# Patient Record
Sex: FEMALE | Race: WHITE | NOT HISPANIC OR LATINO | URBAN - METROPOLITAN AREA
[De-identification: names, ages, dates, MRNs, and addresses within clinical notes are randomized per-mention and may not be internally consistent; named-entity substitution may affect disease eponyms.]

---

## 2019-10-09 ENCOUNTER — TELEPHONE (OUTPATIENT)
Dept: OBSTETRICS AND GYNECOLOGY | Age: 54
End: 2019-10-09

## 2019-10-09 ENCOUNTER — OFFICE VISIT (OUTPATIENT)
Dept: OBSTETRICS AND GYNECOLOGY | Age: 54
End: 2019-10-09

## 2019-10-09 VITALS
WEIGHT: 192 LBS | BODY MASS INDEX: 31.99 KG/M2 | DIASTOLIC BLOOD PRESSURE: 68 MMHG | HEIGHT: 65 IN | SYSTOLIC BLOOD PRESSURE: 128 MMHG

## 2019-10-09 DIAGNOSIS — Z11.51 ENCOUNTER FOR SCREENING FOR HUMAN PAPILLOMAVIRUS (HPV): ICD-10-CM

## 2019-10-09 DIAGNOSIS — N95.1 HOT FLASHES DUE TO MENOPAUSE: ICD-10-CM

## 2019-10-09 DIAGNOSIS — N95.1 HOT FLASHES DUE TO MENOPAUSE: Primary | ICD-10-CM

## 2019-10-09 DIAGNOSIS — Z01.419 WELL WOMAN EXAM WITH ROUTINE GYNECOLOGICAL EXAM: Primary | ICD-10-CM

## 2019-10-09 PROCEDURE — 99386 PREV VISIT NEW AGE 40-64: CPT | Performed by: OBSTETRICS & GYNECOLOGY

## 2019-10-09 RX ORDER — AMITRIPTYLINE HYDROCHLORIDE 50 MG/1
50 TABLET, FILM COATED ORAL NIGHTLY
COMMUNITY
End: 2022-04-12

## 2019-10-09 RX ORDER — ESTRADIOL 0.07 MG/D
1 FILM, EXTENDED RELEASE TRANSDERMAL 2 TIMES WEEKLY
Qty: 8 PATCH | Refills: 11 | Status: SHIPPED | OUTPATIENT
Start: 2019-10-10 | End: 2022-02-17

## 2019-10-09 RX ORDER — LEVOTHYROXINE SODIUM 0.05 MG/1
50 TABLET ORAL DAILY
COMMUNITY
End: 2022-04-12 | Stop reason: SDUPTHER

## 2019-10-09 RX ORDER — ESTRADIOL 1 MG/1
1 TABLET ORAL DAILY
Qty: 30 TABLET | Refills: 11 | Status: SHIPPED | OUTPATIENT
Start: 2019-10-09 | End: 2019-10-09 | Stop reason: CLARIF

## 2019-10-09 RX ORDER — CHOLECALCIFEROL (VITAMIN D3) 125 MCG
10 CAPSULE ORAL NIGHTLY
COMMUNITY

## 2019-10-09 NOTE — TELEPHONE ENCOUNTER
Dr Ghosh pt looked up what her ins will cover: Estradiol bi weekly, Keli, Climara, Kim, or combi-patch. Pharm on file nka

## 2019-10-09 NOTE — PROGRESS NOTES
Chief complaint: annual    Subjective   History of Present Illness    Monica Olguin is a 53 y.o.  who presents for annual exam.  Her menses are absent. LMP 2017. No PMB. C/o hot flashes but getting slightly better. C/o terrible insomnia since menopause. Is interested in hormones. Desires natural progesterone cream. Has tried multiple sleeping pills without improvement.  Soc Hx- worked in [place] in past, now works as a nanny and an  ([place]) , has 2 grown daughter (a doctor and a )  MMG 2019 normal  Colonoscopy  normal  Obstetric History:  OB History      Para Term  AB Living    2 2 2     2    SAB TAB Ectopic Molar Multiple Live Births              2         Menstrual History:     Patient's last menstrual period was 2017 (exact date).         Current contraception: post menopausal status  History of abnormal Pap smear: no  Received Gardasil immunization: no  Perform regular self breast exam: yes -    Family history of uterine or ovarian cancer: no  Family History of colon cancer: no  Family history of breast cancer: no    Mammogram: up to date.  Colonoscopy: up to date.  DEXA: not indicated.    Exercise: moderately active  Calcium/Vitamin D: adequate intake    The following portions of the patient's history were reviewed and updated as appropriate: allergies, current medications, past family history, past medical history, past social history, past surgical history and problem list.    Review of Systems   Constitutional: Negative for activity change, fatigue, fever and unexpected weight change.   Respiratory: Negative for chest tightness and shortness of breath.    Cardiovascular: Negative for chest pain, palpitations and leg swelling.   Gastrointestinal: Negative for abdominal distention, abdominal pain, blood in stool, constipation, diarrhea, nausea and vomiting.   Endocrine: Positive for heat intolerance. Negative for cold intolerance, polydipsia,  "polyphagia and polyuria.   Genitourinary: Negative.    Musculoskeletal: Negative for arthralgias and back pain.   Skin: Negative for color change.   Neurological: Negative for weakness and headaches.   Hematological: Does not bruise/bleed easily.   Psychiatric/Behavioral: Negative for confusion.       Pertinent items are noted in HPI.     Objective   Physical Exam    /68   Ht 165.1 cm (65\")   Wt 87.1 kg (192 lb)   LMP 06/01/2017 (Exact Date)   Breastfeeding? No   BMI 31.95 kg/m²     General:   alert, appears stated age and cooperative   Neck: no asymmetry, masses, or scars   Heart: regular rate and rhythm, S1, S2 normal, no murmur, click, rub or gallop   Lungs: clear to auscultation bilaterally   Abdomen: soft, non-tender, without masses or organomegaly   Breast: inspection negative, no nipple discharge or bleeding, no masses or nodularity palpable   Vulva: Bartholin's, Urethra, Bagley's normal   Vagina: normal mucosa   Cervix: multiparous appearance, no bleeding following Pap and no cervical motion tenderness   Uterus: normal size, mobile, non-tender, normal shape and consistency   Adnexa: normal adnexa and no mass, fullness, tenderness   Rectal: not indicated     Assessment/Plan   Monica was seen today for gynecologic exam.    Diagnoses and all orders for this visit:    Well woman exam with routine gynecological exam  -     PapIG, HPV, Rfx 16 / 18    Encounter for screening for human papillomavirus (HPV)  -     PapIG, HPV, Rfx 16 / 18    Hot flashes due to menopause  -     estradiol (ESTRACE) 1 MG tablet; Take 1 tablet by mouth Daily.  -     progesterone (PROMETRIUM) 100 MG capsule; Take 1 capsule by mouth Daily.    I do not recommend non-FDA approved hormones from compounding pharmacy. Not sure how much \"progesterone\" she would be getting using their progesterone cream along with oral estrogen. Could consider estrogen/progesterone patch, but not covered by Medicaid (need generic meds) pt can call " insurance to see what is covered . Risks of hormones discussed- DVT, PE, stroke, VTE,breast cancer and MI. Recommend lowest dose for shortest amt of time (no more than 5 yrs).    Breast self exam technique reviewed and patient encouraged to perform self-exam monthly.  Discussed healthy lifestyle modifications.  Pap smear sent

## 2019-10-09 NOTE — TELEPHONE ENCOUNTER
Ok, I changed the estrogen to a patch form. That will be estrogen only. She still needs to take the prometrium 100 mg tab daily (the progesterone). That is the best progesterone to help with insomnia.

## 2019-10-11 LAB
CYTOLOGIST CVX/VAG CYTO: NORMAL
CYTOLOGY CVX/VAG DOC CYTO: NORMAL
CYTOLOGY CVX/VAG DOC THIN PREP: NORMAL
DX ICD CODE: NORMAL
HIV 1 & 2 AB SER-IMP: NORMAL
HPV I/H RISK 1 DNA CVX QL PROBE+SIG AMP: NEGATIVE
OTHER STN SPEC: NORMAL
STAT OF ADQ CVX/VAG CYTO-IMP: NORMAL

## 2019-10-16 ENCOUNTER — TELEPHONE (OUTPATIENT)
Dept: OBSTETRICS AND GYNECOLOGY | Age: 54
End: 2019-10-16

## 2022-02-17 ENCOUNTER — OFFICE VISIT (OUTPATIENT)
Dept: OBSTETRICS AND GYNECOLOGY | Facility: CLINIC | Age: 57
End: 2022-02-17

## 2022-02-17 VITALS
DIASTOLIC BLOOD PRESSURE: 62 MMHG | BODY MASS INDEX: 29.82 KG/M2 | WEIGHT: 179 LBS | SYSTOLIC BLOOD PRESSURE: 108 MMHG | HEIGHT: 65 IN

## 2022-02-17 DIAGNOSIS — Z01.419 PAP SMEAR, LOW-RISK: ICD-10-CM

## 2022-02-17 DIAGNOSIS — G47.00 INSOMNIA, UNSPECIFIED TYPE: ICD-10-CM

## 2022-02-17 DIAGNOSIS — Z01.419 ROUTINE GYNECOLOGICAL EXAMINATION: Primary | ICD-10-CM

## 2022-02-17 DIAGNOSIS — Z13.89 SCREENING FOR GENITOURINARY CONDITION: ICD-10-CM

## 2022-02-17 DIAGNOSIS — N95.2 VAGINAL ATROPHY: ICD-10-CM

## 2022-02-17 DIAGNOSIS — Z11.51 SPECIAL SCREENING EXAMINATION FOR HUMAN PAPILLOMAVIRUS (HPV): ICD-10-CM

## 2022-02-17 LAB
BILIRUB BLD-MCNC: NEGATIVE MG/DL
CLARITY, POC: CLEAR
COLOR UR: YELLOW
GLUCOSE UR STRIP-MCNC: NEGATIVE MG/DL
KETONES UR QL: NEGATIVE
LEUKOCYTE EST, POC: NEGATIVE
NITRITE UR-MCNC: NEGATIVE MG/ML
PH UR: 5 [PH] (ref 5–8)
PROT UR STRIP-MCNC: NEGATIVE MG/DL
RBC # UR STRIP: NEGATIVE /UL
SP GR UR: 1.03 (ref 1–1.03)
UROBILINOGEN UR QL: NORMAL

## 2022-02-17 PROCEDURE — 2014F MENTAL STATUS ASSESS: CPT | Performed by: OBSTETRICS & GYNECOLOGY

## 2022-02-17 PROCEDURE — 99396 PREV VISIT EST AGE 40-64: CPT | Performed by: OBSTETRICS & GYNECOLOGY

## 2022-02-17 PROCEDURE — 3008F BODY MASS INDEX DOCD: CPT | Performed by: OBSTETRICS & GYNECOLOGY

## 2022-02-17 RX ORDER — HYDROXYZINE HYDROCHLORIDE 25 MG/1
50 TABLET, FILM COATED ORAL DAILY
COMMUNITY
Start: 2022-01-26

## 2022-02-17 RX ORDER — ESTRADIOL 0.1 MG/G
CREAM VAGINAL
Qty: 1 G | Refills: 12 | Status: SHIPPED | OUTPATIENT
Start: 2022-02-17 | End: 2023-02-21 | Stop reason: SDUPTHER

## 2022-02-17 RX ORDER — PROGESTERONE 100 MG/1
100 CAPSULE ORAL DAILY
Qty: 30 CAPSULE | Refills: 2 | Status: SHIPPED | OUTPATIENT
Start: 2022-02-17 | End: 2022-04-12

## 2022-02-17 RX ORDER — ACETAMINOPHEN 160 MG
2000 TABLET,DISINTEGRATING ORAL DAILY
COMMUNITY
Start: 2022-01-15

## 2022-02-17 NOTE — PROGRESS NOTES
GYN Annual Exam     CC- Here for annual exam.     Monica Olguin is a 56 y.o. female who presents for annual well woman exam. Pt is a new pt to me. Pt had her LMP 50-52yo. Pt started HRT last year for insomnia: she was placed on estradiol and Prometrium. She has now stopped the HRT bc she doesn't want to be on her meds. She thinks the HRT may have helped some. She is now using some CBD oil and this is helping. Rare vasomotor sx. Pt is cramping intermittently every day. No VB. Pt has had a colonoscopy 1-2 years ago. Pt is not sexually active. She is reporting vaginal irritation and sx like a UTI.    OB History        2    Para   2    Term   2            AB        Living   2       SAB        IAB        Ectopic        Molar        Multiple        Live Births   2                Current contraception: post menopausal status  History of abnormal Pap smear: no  History of abnormal mammogram: no  Family history of uterine, colon or ovarian cancer: no  Family history of breast cancer: no    Health Maintenance   Topic Date Due   • MAMMOGRAM  Never done   • URINE MICROALBUMIN  Never done   • COLORECTAL CANCER SCREENING  Never done   • ANNUAL PHYSICAL  Never done   • Pneumococcal Vaccine 0-64 (1 of 2 - PPSV23) Never done   • ZOSTER VACCINE (1 of 2) Never done   • HEPATITIS C SCREENING  Never done   • DIABETIC FOOT EXAM  Never done   • HEMOGLOBIN A1C  Never done   • DIABETIC EYE EXAM  Never done   • Annual Gynecologic Pelvic and Breast Exam  10/10/2020   • PAP SMEAR  10/09/2022   • TDAP/TD VACCINES (2 - Td or Tdap) 2031   • COVID-19 Vaccine  Completed   • INFLUENZA VACCINE  Completed       Past Medical History:   Diagnosis Date   • Diabetes mellitus (HCC)    • Disease of thyroid gland        History reviewed. No pertinent surgical history.      Current Outpatient Medications:   •  amitriptyline (ELAVIL) 50 MG tablet, Take 50 mg by mouth Every Night., Disp: , Rfl:   •  Cholecalciferol (Vitamin D3) 50 MCG  "(2000 UT) capsule, , Disp: , Rfl:   •  estradiol (ESTRACE VAGINAL) 0.1 MG/GM vaginal cream, Insert 1gm vaginally x 2 weeks and then 2x per week, Disp: 1 g, Rfl: 12  •  hydrOXYzine (ATARAX) 25 MG tablet, , Disp: , Rfl:   •  levothyroxine (SYNTHROID, LEVOTHROID) 50 MCG tablet, Take 50 mcg by mouth Daily., Disp: , Rfl:   •  melatonin 5 MG tablet tablet, Take 10 mg by mouth., Disp: , Rfl:   •  progesterone (PROMETRIUM) 100 MG capsule, Take 1 capsule by mouth Daily., Disp: 30 capsule, Rfl: 11  •  Progesterone (Prometrium) 100 MG capsule, Take 1 capsule by mouth Daily., Disp: 30 capsule, Rfl: 2    Allergies   Allergen Reactions   • Eggs Or Egg-Derived Products Shortness Of Breath and Swelling   • Latex Other (See Comments)   • Nuts Rash       Social History     Tobacco Use   • Smoking status: Never Smoker   • Smokeless tobacco: Never Used   Substance Use Topics   • Alcohol use: Yes     Comment: Social   • Drug use: No       Family History   Problem Relation Age of Onset   • Prostate cancer Father    • Diabetes Father    • Diabetes Mother    • Breast cancer Neg Hx    • Ovarian cancer Neg Hx    • Uterine cancer Neg Hx    • Colon cancer Neg Hx        Review of Systems   Constitutional: Negative for appetite change, chills, fatigue, fever and unexpected weight change.   Gastrointestinal: Negative for abdominal distention, abdominal pain, anal bleeding, blood in stool, constipation, diarrhea, nausea and vomiting.   Genitourinary: Negative for dyspareunia, dysuria, menstrual problem, pelvic pain, vaginal bleeding, vaginal discharge and vaginal pain.       /62   Ht 165.1 cm (65\")   Wt 81.2 kg (179 lb)   LMP 06/01/2017 (Exact Date)   BMI 29.79 kg/m²     Physical Exam  Vitals reviewed.   Constitutional:       General: She is not in acute distress.     Appearance: Normal appearance. She is well-developed. She is not ill-appearing, toxic-appearing or diaphoretic.   HENT:      Mouth/Throat:      Dentition: Normal dentition. " No dental caries.   Cardiovascular:      Rate and Rhythm: Normal rate and regular rhythm.      Heart sounds: Normal heart sounds.   Pulmonary:      Effort: Pulmonary effort is normal. No respiratory distress.      Breath sounds: Normal breath sounds. No stridor. No wheezing.   Chest:   Breasts:      Right: No inverted nipple, mass, nipple discharge, skin change or tenderness.      Left: No inverted nipple, mass, nipple discharge, skin change or tenderness.       Abdominal:      General: There is no distension.      Palpations: Abdomen is soft. There is no mass.      Tenderness: There is no abdominal tenderness.   Genitourinary:     General: Normal vulva.      Labia:         Right: No rash, tenderness or lesion.         Left: No rash, tenderness or lesion.       Vagina: No vaginal discharge, tenderness or bleeding.      Cervix: No cervical motion tenderness, discharge or friability.      Uterus: Not deviated, not enlarged, not fixed and not tender.       Adnexa:         Right: No mass, tenderness or fullness.          Left: No mass, tenderness or fullness.     Musculoskeletal:         General: No tenderness. Normal range of motion.   Skin:     General: Skin is warm.      Findings: No erythema or rash.   Neurological:      General: No focal deficit present.      Mental Status: She is alert and oriented to person, place, and time. Mental status is at baseline.      Cranial Nerves: No cranial nerve deficit.      Motor: No weakness.      Coordination: Coordination normal.      Gait: Gait normal.   Psychiatric:         Mood and Affect: Mood normal.         Behavior: Behavior normal.         Thought Content: Thought content normal.         Judgment: Judgment normal.            Assessment/Plan    1) GYN HM: check pap smear. MMG ordered. SBE demonstrated and encouraged. Colonoscopy UTD.  2) : menopause age 50-51 years. Pt struggling with insomnia. Discussed restating Prometrium 100mg po qhs ONLY. Pt denies any vasomotor  sx so she does not need estrogen. If pt does not notice any improvement after 1-2 months on meds then wouldn't continue. Pt agrees with plan.  3) Bone health - Weight bearing exercise, dietary calcium recommendations and vitamin D reviewed.   4) Diet and Exercise discussed  5) Smoking Status: nonsmoker  6) Social: pt's daughter is  Nelda Ramy  7) vaginal irritation: Suspect atrophy. Discussed Replens vs Estarce cream. Pt desires to start Estarce cream- rx written.  8) Follow up prn and 1 year       Diagnoses and all orders for this visit:    Routine gynecological examination  -     IgP, Aptima HPV  -     Mammo Screening Bilateral With CAD; Future    Screening for genitourinary condition  -     POC Urinalysis Dipstick    Pap smear, low-risk  -     IgP, Aptima HPV    Special screening examination for human papillomavirus (HPV)  -     IgP, Aptima HPV    Insomnia, unspecified type    Vaginal atrophy    Other orders  -     Cholecalciferol (Vitamin D3) 50 MCG (2000 UT) capsule  -     hydrOXYzine (ATARAX) 25 MG tablet  -     Progesterone (Prometrium) 100 MG capsule; Take 1 capsule by mouth Daily.  -     estradiol (ESTRACE VAGINAL) 0.1 MG/GM vaginal cream; Insert 1gm vaginally x 2 weeks and then 2x per week        Lavern Miner, DO  2/17/2022  14:48 EST

## 2022-02-21 ENCOUNTER — TRANSCRIBE ORDERS (OUTPATIENT)
Dept: ADMINISTRATIVE | Facility: HOSPITAL | Age: 57
End: 2022-02-21

## 2022-02-21 DIAGNOSIS — Z12.31 SCREENING MAMMOGRAM, ENCOUNTER FOR: Primary | ICD-10-CM

## 2022-02-22 LAB
CYTOLOGIST CVX/VAG CYTO: NORMAL
CYTOLOGY CVX/VAG DOC CYTO: NORMAL
CYTOLOGY CVX/VAG DOC THIN PREP: NORMAL
DX ICD CODE: NORMAL
HIV 1 & 2 AB SER-IMP: NORMAL
HPV I/H RISK 4 DNA CVX QL PROBE+SIG AMP: NEGATIVE
OTHER STN SPEC: NORMAL
STAT OF ADQ CVX/VAG CYTO-IMP: NORMAL

## 2022-03-12 ENCOUNTER — HOSPITAL ENCOUNTER (OUTPATIENT)
Dept: MAMMOGRAPHY | Facility: HOSPITAL | Age: 57
Discharge: HOME | End: 2022-03-12
Admitting: OBSTETRICS & GYNECOLOGY

## 2022-03-12 ENCOUNTER — APPOINTMENT (OUTPATIENT)
Dept: MAMMOGRAPHY | Facility: HOSPITAL | Age: 57
End: 2022-03-12

## 2022-03-12 DIAGNOSIS — Z12.31 SCREENING MAMMOGRAM, ENCOUNTER FOR: ICD-10-CM

## 2022-03-12 PROCEDURE — 77067 SCR MAMMO BI INCL CAD: CPT

## 2022-03-12 PROCEDURE — 77063 BREAST TOMOSYNTHESIS BI: CPT

## 2022-03-18 ENCOUNTER — TELEPHONE (OUTPATIENT)
Dept: OBSTETRICS AND GYNECOLOGY | Facility: CLINIC | Age: 57
End: 2022-03-18

## 2022-03-18 NOTE — TELEPHONE ENCOUNTER
Patient called and stated she used the Rx for 2 weeks, but missed a dose and then doubled up for 2 weeks. She feels better, but had bleeding for 2 days. Wants to know if she could have another round of the medication. Please advise

## 2022-03-19 NOTE — TELEPHONE ENCOUNTER
I dont know what medicaiton you are referring? I gave her Estrace cream that she was supposed to use nightly for 2 weeks and then continue to use 2-3 times per week. If that what you are talking about?

## 2022-03-21 NOTE — TELEPHONE ENCOUNTER
PATIENT STATES IT FOR THE ESTRACE CREAM AND WOULD LIKE TO KNOW IF SHE NEEDS AN U/S BECAUSE SHE WAS BLEEDING FOR 2 DAYS?

## 2022-04-12 ENCOUNTER — PREP FOR SURGERY (OUTPATIENT)
Dept: OTHER | Facility: HOSPITAL | Age: 57
End: 2022-04-12

## 2022-04-12 ENCOUNTER — OFFICE VISIT (OUTPATIENT)
Dept: OBSTETRICS AND GYNECOLOGY | Facility: CLINIC | Age: 57
End: 2022-04-12

## 2022-04-12 VITALS
BODY MASS INDEX: 29.99 KG/M2 | DIASTOLIC BLOOD PRESSURE: 72 MMHG | SYSTOLIC BLOOD PRESSURE: 112 MMHG | WEIGHT: 180 LBS | HEIGHT: 65 IN

## 2022-04-12 DIAGNOSIS — N95.0 POSTMENOPAUSAL BLEEDING: Primary | ICD-10-CM

## 2022-04-12 DIAGNOSIS — N95.0 PMB (POSTMENOPAUSAL BLEEDING): Primary | ICD-10-CM

## 2022-04-12 DIAGNOSIS — Z13.89 SCREENING FOR GENITOURINARY CONDITION: ICD-10-CM

## 2022-04-12 LAB
BILIRUB BLD-MCNC: NEGATIVE MG/DL
CLARITY, POC: CLEAR
COLOR UR: YELLOW
GLUCOSE UR STRIP-MCNC: NEGATIVE MG/DL
KETONES UR QL: NEGATIVE
LEUKOCYTE EST, POC: NEGATIVE
NITRITE UR-MCNC: NEGATIVE MG/ML
PH UR: 5 [PH] (ref 5–8)
PROT UR STRIP-MCNC: NEGATIVE MG/DL
RBC # UR STRIP: NEGATIVE /UL
SP GR UR: 1.02 (ref 1–1.03)
UROBILINOGEN UR QL: NORMAL

## 2022-04-12 PROCEDURE — 99214 OFFICE O/P EST MOD 30 MIN: CPT | Performed by: OBSTETRICS & GYNECOLOGY

## 2022-04-12 RX ORDER — LEVOTHYROXINE SODIUM 0.05 MG/1
1 TABLET ORAL
COMMUNITY
Start: 2022-02-18

## 2022-04-12 RX ORDER — SODIUM CHLORIDE 9 MG/ML
40 INJECTION, SOLUTION INTRAVENOUS AS NEEDED
Status: CANCELLED | OUTPATIENT
Start: 2022-04-12

## 2022-04-12 RX ORDER — SODIUM CHLORIDE 0.9 % (FLUSH) 0.9 %
3 SYRINGE (ML) INJECTION EVERY 12 HOURS SCHEDULED
Status: CANCELLED | OUTPATIENT
Start: 2022-04-12

## 2022-04-12 RX ORDER — SODIUM CHLORIDE 0.9 % (FLUSH) 0.9 %
1-10 SYRINGE (ML) INJECTION AS NEEDED
Status: CANCELLED | OUTPATIENT
Start: 2022-04-12

## 2022-04-12 NOTE — PROGRESS NOTES
"PROBLEM VISIT    Chief Complaint: PMPB      Monica Olguin is a 56 y.o. patient who presents for follow up of PMPB. Pt started Estrace cream and Prometrium. Pt reports she had 2 days of bright red bleeding. She had another day of spotting. She has not had a cycle in 5 years; She used 2gm of Estrace cream vaginally for 2 weeks and then started with the bleeding. She reports the estrogen cream helped with the burning she as feeling. She has started Prometrium 100mg po qhs and it has helped with sleeping. TVUS performed today reveals EM lining 7mm. She had last bit of bleeding approx 2 weeks ago. She is now using the Estrace cream 2x per week.    Chief Complaint   Patient presents with   • Follow-up     U/S             The following portions of the patient's history were reviewed and updated as appropriate: allergies, current medications and problem list.    Review of Systems   Constitutional: Negative for appetite change, chills, fatigue, fever and unexpected weight change.   Gastrointestinal: Negative for abdominal distention, abdominal pain, anal bleeding, blood in stool, constipation, diarrhea, nausea and vomiting.   Genitourinary: Positive for vaginal bleeding. Negative for dyspareunia, dysuria, menstrual problem, pelvic pain, vaginal discharge and vaginal pain.       /72   Ht 165.1 cm (65\")   Wt 81.6 kg (180 lb)   LMP 06/01/2017 (Exact Date)   BMI 29.95 kg/m²     Physical Exam  Vitals reviewed.   Constitutional:       General: She is not in acute distress.     Appearance: Normal appearance. She is well-developed. She is not ill-appearing, toxic-appearing or diaphoretic.   Genitourinary:     Labia:         Right: No rash, tenderness, lesion or injury.         Left: No rash, tenderness, lesion or injury.       Vagina: No signs of injury and foreign body. No vaginal discharge, erythema, tenderness or bleeding.      Cervix: No cervical motion tenderness, discharge or friability.   Skin:     General: Skin " is warm.      Coloration: Skin is not pale.      Findings: No erythema or rash.   Neurological:      General: No focal deficit present.      Mental Status: She is alert and oriented to person, place, and time. Mental status is at baseline.      Cranial Nerves: No cranial nerve deficit.      Coordination: Coordination normal.   Psychiatric:         Mood and Affect: Mood normal.         Behavior: Behavior normal.         Thought Content: Thought content normal.         Judgment: Judgment normal.           Assessment/Plan   Diagnoses and all orders for this visit:    1. Postmenopausal bleeding (Primary)    2. Screening for genitourinary condition  -     POC Urinalysis Dipstick    55yo with PMPB    1) PMPB: TVUS ordered today and reveals: AV uterus with smooth borders. EM lining 7mm. Normal ovaries bilaterally. No free fluid. Results reviewed with pt. EM bx attempted but unsuccessful due to stenosis of cervix. Will proceed with hysteroscopy D and C. Procedure reviewed with pt. Risks, benefits and alternatives of the procedure were discussed, including , but not limited to: infection, bleeding, transfusion, injury to adjacent structures including bowel, bladder and ureters, reoperation, recurrent symptoms, thromboembolic events, aneasthesic complications and death. Pre/intra/postop course was reviewed and all questions answered. Patient was encouraged to call for any additional questions she might have in the future. Will schedule.    2) vaginal irritation: Improved since starting Estrace cream    3) difficulty sleeping: Improved with Prometrium    4) Gyn HM: LPS 2/17/22                 No follow-ups on file.      Lavern Miner DO    4/12/2022  15:49 EDT

## 2022-04-13 PROBLEM — N95.0 PMB (POSTMENOPAUSAL BLEEDING): Status: ACTIVE | Noted: 2022-04-13

## 2022-05-04 ENCOUNTER — APPOINTMENT (OUTPATIENT)
Dept: PREADMISSION TESTING | Facility: HOSPITAL | Age: 57
End: 2022-05-04

## 2022-05-09 ENCOUNTER — PRE-ADMISSION TESTING (OUTPATIENT)
Dept: PREADMISSION TESTING | Facility: HOSPITAL | Age: 57
End: 2022-05-09

## 2022-05-09 ENCOUNTER — APPOINTMENT (OUTPATIENT)
Dept: LAB | Facility: HOSPITAL | Age: 57
End: 2022-05-09

## 2022-05-09 VITALS
BODY MASS INDEX: 30.16 KG/M2 | HEIGHT: 65 IN | HEART RATE: 67 BPM | OXYGEN SATURATION: 99 % | DIASTOLIC BLOOD PRESSURE: 77 MMHG | SYSTOLIC BLOOD PRESSURE: 157 MMHG | WEIGHT: 181 LBS | RESPIRATION RATE: 18 BRPM

## 2022-05-09 LAB
DEPRECATED RDW RBC AUTO: 40.6 FL (ref 37–54)
ERYTHROCYTE [DISTWIDTH] IN BLOOD BY AUTOMATED COUNT: 11.8 % (ref 12.3–15.4)
HCT VFR BLD AUTO: 40.4 % (ref 34–46.6)
HGB BLD-MCNC: 13 G/DL (ref 12–15.9)
MCH RBC QN AUTO: 29.5 PG (ref 26.6–33)
MCHC RBC AUTO-ENTMCNC: 32.2 G/DL (ref 31.5–35.7)
MCV RBC AUTO: 91.8 FL (ref 79–97)
PLATELET # BLD AUTO: 185 10*3/MM3 (ref 140–450)
PMV BLD AUTO: 10.2 FL (ref 6–12)
RBC # BLD AUTO: 4.4 10*6/MM3 (ref 3.77–5.28)
SARS-COV-2 RNA PNL SPEC NAA+PROBE: NOT DETECTED
WBC NRBC COR # BLD: 5.17 10*3/MM3 (ref 3.4–10.8)

## 2022-05-09 PROCEDURE — C9803 HOPD COVID-19 SPEC COLLECT: HCPCS

## 2022-05-09 PROCEDURE — 87635 SARS-COV-2 COVID-19 AMP PRB: CPT | Performed by: OBSTETRICS & GYNECOLOGY

## 2022-05-09 PROCEDURE — 36415 COLL VENOUS BLD VENIPUNCTURE: CPT

## 2022-05-09 PROCEDURE — 85027 COMPLETE CBC AUTOMATED: CPT | Performed by: OBSTETRICS & GYNECOLOGY

## 2022-05-09 NOTE — PAT
Pt here for PAT visit.  Pre-op tests completed, chg soap given, and instructions reviewed.  Instructed clears until 2 hrs prior to arrival time, voiced understanding. Pt had several questions concerning consents. Questions answered and patient instructed to call office for more information concerning procedure.

## 2022-05-09 NOTE — DISCHARGE INSTRUCTIONS
PRE-ADMISSION TESTING INSTRUCTIONS FOR ADULTS    Take these medications the morning of surgery with a small sip of water: Levothyroxine, Hydroxyzine      No aspirin, advil, aleve, ibuprofen, naproxen, diet pills, decongestants, or herbal/vitamins for a week prior to surgery.    General Instructions:    DO NOT EAT SOLID FOOD AFTER MIDNIGHT THE NIGHT BEFORE SURGERY. No gum, mints, or hard candy after midnight the night before surgery.  You may drink clear liquids the day of surgery up until 2 hours before your arrival time.  Clear liquids are liquids you can see through. Nothing RED in color.    Plain water    Sports drinks  Sodas     Gelatin (Jell-O)  Fruit juices without pulp such as white grape juice and apple juice  Popsicles that contain no fruit or yogurt  Tea or coffee (no cream or milk added)    It is beneficial for you to have a clear drink that contains carbohydrates just before you leave your house and before your fasting time begins.  We suggest a 20 ounce bottle of Gatorade or Powerade for non-diabetic patients or a 20 ounce bottle of G2 or Powerade Zero for diabetic patients.     Patients who avoid smoking, chewing tobacco and alcohol for 4 weeks prior to surgery have a reduced risk of post-operative complications.  If at all possible, quit smoking as many days before surgery as you can.    Do not smoke, use chewing tobacco or drink alcohol the day of surgery    Bring your C-PAP/ BI-PAP machine if you use one.  Wear clean comfortable clothes and socks.  Do not wear contact lenses, lotion, deodorant, or make-up.  Bring a case for your glasses if applicable. You may brush your teeth the morning of surgery.  You may wear dentures/partials, do not put adhesive/glue on them.    Leave all other jewelry and valuables at home.      Preventing a Surgical Site Infection:    Shower the night before and on the morning of surgery using the chlorhexidine soap you were given.  Use a clean washcloth with the soap.   Place clean sheets on your bed after showering the night before surgery. Do not use the CHG soap on your hair, face, or private areas. Wash your body gently for five (5) minutes. Do not scrub your skin.  Dry with a clean towel and dress in clean clothing.    Do not shave the surgical area for 10 days-2 weeks prior to surgery  because the razor can irritate skin and make it easier to develop an infection.  Make sure you, your family, and all healthcare providers clean their hands with soap and water or an alcohol based hand  before caring for you or your wound.      Day of surgery:    Your surgeon’s office will advise you of your arrival time for the day of surgery.    Upon arrival, a Pre-op nurse and Anesthesia provider will review your health history, obtain vital signs, and answer questions you may have.  The only belongings needed at this time will be your home medications and if applicable your C-PAP/BI-PAP machine.  If you are staying overnight your family can leave the rest of your belongings in the car and bring them to your room later.  A Pre-op nurse will start an IV and you may receive medication in preparation for surgery, including something to help you relax.  Your family will be able to see you in the Pre-op area.  While you are in surgery your family should notify the waiting room  if they leave the waiting room area and provide a contact phone number.    IF you have any questions, you can call the Pre-Admission Department at [phone] or your surgeon's office.  Notify your surgeon if  you become sick, have a fever, productive cough, or cannot be here the day of surgery    Please be aware that surgery does come with discomfort.  We want to make every effort to control your discomfort so please discuss any uncontrolled symptoms with your nurse.   Your doctor will most likely have prescribed pain medications.      If you are going home after surgery, you will receive  individualized written care instructions before being discharged.  A responsible adult (over the age of 18) must drive you to and from the hospital on the day of your surgery and stay with you for 24 hours after anesthesia.    If you are staying overnight following surgery, you will be transported to your hospital room following the recovery period.  [facility] has all private rooms.    You may receive a survey regarding the care you received. Your feedback is very important and will be used to collect the necessary data to help us to continue to provide excellent care.     Deductibles and co-payments are collected on the day of service. Please be prepared to pay the required co-pay, deductible or deposit on the day of service as defined by your plan.

## 2022-05-10 ENCOUNTER — ANESTHESIA EVENT (OUTPATIENT)
Dept: PERIOP | Facility: HOSPITAL | Age: 57
End: 2022-05-10

## 2022-05-11 ENCOUNTER — HOSPITAL ENCOUNTER (OUTPATIENT)
Facility: HOSPITAL | Age: 57
Setting detail: HOSPITAL OUTPATIENT SURGERY
Discharge: HOME | End: 2022-05-11
Attending: OBSTETRICS & GYNECOLOGY | Admitting: OBSTETRICS & GYNECOLOGY

## 2022-05-11 ENCOUNTER — ANESTHESIA (OUTPATIENT)
Dept: PERIOP | Facility: HOSPITAL | Age: 57
End: 2022-05-11

## 2022-05-11 VITALS
SYSTOLIC BLOOD PRESSURE: 117 MMHG | HEART RATE: 59 BPM | BODY MASS INDEX: 30.12 KG/M2 | DIASTOLIC BLOOD PRESSURE: 62 MMHG | RESPIRATION RATE: 14 BRPM | OXYGEN SATURATION: 97 % | TEMPERATURE: 98.7 F | WEIGHT: 181 LBS

## 2022-05-11 DIAGNOSIS — N95.0 PMB (POSTMENOPAUSAL BLEEDING): ICD-10-CM

## 2022-05-11 PROCEDURE — 88305 TISSUE EXAM BY PATHOLOGIST: CPT | Performed by: OBSTETRICS & GYNECOLOGY

## 2022-05-11 PROCEDURE — 25010000002 PROPOFOL 10 MG/ML EMULSION: Performed by: NURSE ANESTHETIST, CERTIFIED REGISTERED

## 2022-05-11 PROCEDURE — 58558 HYSTEROSCOPY BIOPSY: CPT | Performed by: OBSTETRICS & GYNECOLOGY

## 2022-05-11 PROCEDURE — 25010000002 DEXAMETHASONE PER 1 MG: Performed by: NURSE ANESTHETIST, CERTIFIED REGISTERED

## 2022-05-11 PROCEDURE — 25010000002 ONDANSETRON PER 1 MG: Performed by: NURSE ANESTHETIST, CERTIFIED REGISTERED

## 2022-05-11 PROCEDURE — 25010000002 KETOROLAC TROMETHAMINE PER 15 MG: Performed by: NURSE ANESTHETIST, CERTIFIED REGISTERED

## 2022-05-11 PROCEDURE — 25010000002 FENTANYL CITRATE (PF) 50 MCG/ML SOLUTION: Performed by: NURSE ANESTHETIST, CERTIFIED REGISTERED

## 2022-05-11 RX ORDER — OXYCODONE HYDROCHLORIDE AND ACETAMINOPHEN 5; 325 MG/1; MG/1
1 TABLET ORAL ONCE AS NEEDED
Status: DISCONTINUED | OUTPATIENT
Start: 2022-05-11 | End: 2022-05-11 | Stop reason: HOSPADM

## 2022-05-11 RX ORDER — SODIUM CHLORIDE 0.9 % (FLUSH) 0.9 %
10 SYRINGE (ML) INJECTION EVERY 12 HOURS SCHEDULED
Status: DISCONTINUED | OUTPATIENT
Start: 2022-05-11 | End: 2022-05-11 | Stop reason: HOSPADM

## 2022-05-11 RX ORDER — OXYCODONE AND ACETAMINOPHEN 7.5; 325 MG/1; MG/1
1 TABLET ORAL ONCE AS NEEDED
Status: DISCONTINUED | OUTPATIENT
Start: 2022-05-11 | End: 2022-05-11 | Stop reason: HOSPADM

## 2022-05-11 RX ORDER — ONDANSETRON 2 MG/ML
4 INJECTION INTRAMUSCULAR; INTRAVENOUS ONCE AS NEEDED
Status: DISCONTINUED | OUTPATIENT
Start: 2022-05-11 | End: 2022-05-11 | Stop reason: HOSPADM

## 2022-05-11 RX ORDER — LIDOCAINE HYDROCHLORIDE 10 MG/ML
0.5 INJECTION, SOLUTION EPIDURAL; INFILTRATION; INTRACAUDAL; PERINEURAL ONCE AS NEEDED
Status: DISCONTINUED | OUTPATIENT
Start: 2022-05-11 | End: 2022-05-11 | Stop reason: HOSPADM

## 2022-05-11 RX ORDER — KETAMINE HYDROCHLORIDE 10 MG/ML
INJECTION INTRAMUSCULAR; INTRAVENOUS AS NEEDED
Status: DISCONTINUED | OUTPATIENT
Start: 2022-05-11 | End: 2022-05-11 | Stop reason: SURG

## 2022-05-11 RX ORDER — SODIUM CHLORIDE, SODIUM LACTATE, POTASSIUM CHLORIDE, CALCIUM CHLORIDE 600; 310; 30; 20 MG/100ML; MG/100ML; MG/100ML; MG/100ML
100 INJECTION, SOLUTION INTRAVENOUS CONTINUOUS
Status: DISCONTINUED | OUTPATIENT
Start: 2022-05-11 | End: 2022-05-11 | Stop reason: HOSPADM

## 2022-05-11 RX ORDER — FENTANYL CITRATE 50 UG/ML
INJECTION, SOLUTION INTRAMUSCULAR; INTRAVENOUS AS NEEDED
Status: DISCONTINUED | OUTPATIENT
Start: 2022-05-11 | End: 2022-05-11 | Stop reason: SURG

## 2022-05-11 RX ORDER — MIDAZOLAM HYDROCHLORIDE 2 MG/2ML
0.5 INJECTION, SOLUTION INTRAMUSCULAR; INTRAVENOUS
Status: DISCONTINUED | OUTPATIENT
Start: 2022-05-11 | End: 2022-05-11 | Stop reason: HOSPADM

## 2022-05-11 RX ORDER — MAGNESIUM HYDROXIDE 1200 MG/15ML
LIQUID ORAL AS NEEDED
Status: DISCONTINUED | OUTPATIENT
Start: 2022-05-11 | End: 2022-05-11 | Stop reason: HOSPADM

## 2022-05-11 RX ORDER — LIDOCAINE HYDROCHLORIDE 20 MG/ML
INJECTION, SOLUTION INFILTRATION; PERINEURAL AS NEEDED
Status: DISCONTINUED | OUTPATIENT
Start: 2022-05-11 | End: 2022-05-11 | Stop reason: SURG

## 2022-05-11 RX ORDER — SODIUM CHLORIDE 0.9 % (FLUSH) 0.9 %
3 SYRINGE (ML) INJECTION EVERY 12 HOURS SCHEDULED
Status: DISCONTINUED | OUTPATIENT
Start: 2022-05-11 | End: 2022-05-11 | Stop reason: HOSPADM

## 2022-05-11 RX ORDER — FAMOTIDINE 10 MG/ML
20 INJECTION, SOLUTION INTRAVENOUS
Status: COMPLETED | OUTPATIENT
Start: 2022-05-11 | End: 2022-05-11

## 2022-05-11 RX ORDER — PROPOFOL 10 MG/ML
VIAL (ML) INTRAVENOUS AS NEEDED
Status: DISCONTINUED | OUTPATIENT
Start: 2022-05-11 | End: 2022-05-11 | Stop reason: SURG

## 2022-05-11 RX ORDER — SODIUM CHLORIDE, SODIUM LACTATE, POTASSIUM CHLORIDE, CALCIUM CHLORIDE 600; 310; 30; 20 MG/100ML; MG/100ML; MG/100ML; MG/100ML
9 INJECTION, SOLUTION INTRAVENOUS CONTINUOUS PRN
Status: DISCONTINUED | OUTPATIENT
Start: 2022-05-11 | End: 2022-05-11 | Stop reason: HOSPADM

## 2022-05-11 RX ORDER — DEXAMETHASONE SODIUM PHOSPHATE 4 MG/ML
8 INJECTION, SOLUTION INTRA-ARTICULAR; INTRALESIONAL; INTRAMUSCULAR; INTRAVENOUS; SOFT TISSUE ONCE AS NEEDED
Status: COMPLETED | OUTPATIENT
Start: 2022-05-11 | End: 2022-05-11

## 2022-05-11 RX ORDER — SODIUM CHLORIDE 9 MG/ML
40 INJECTION, SOLUTION INTRAVENOUS AS NEEDED
Status: DISCONTINUED | OUTPATIENT
Start: 2022-05-11 | End: 2022-05-11 | Stop reason: HOSPADM

## 2022-05-11 RX ORDER — SODIUM CHLORIDE 0.9 % (FLUSH) 0.9 %
10 SYRINGE (ML) INJECTION AS NEEDED
Status: DISCONTINUED | OUTPATIENT
Start: 2022-05-11 | End: 2022-05-11 | Stop reason: HOSPADM

## 2022-05-11 RX ORDER — IBUPROFEN 800 MG/1
800 TABLET ORAL EVERY 8 HOURS PRN
Qty: 30 TABLET | Refills: 0 | Status: SHIPPED | OUTPATIENT
Start: 2022-05-11 | End: 2022-06-10

## 2022-05-11 RX ORDER — KETOROLAC TROMETHAMINE 30 MG/ML
INJECTION, SOLUTION INTRAMUSCULAR; INTRAVENOUS AS NEEDED
Status: DISCONTINUED | OUTPATIENT
Start: 2022-05-11 | End: 2022-05-11 | Stop reason: SURG

## 2022-05-11 RX ORDER — SODIUM CHLORIDE 0.9 % (FLUSH) 0.9 %
1-10 SYRINGE (ML) INJECTION AS NEEDED
Status: DISCONTINUED | OUTPATIENT
Start: 2022-05-11 | End: 2022-05-11 | Stop reason: HOSPADM

## 2022-05-11 RX ORDER — ONDANSETRON 2 MG/ML
4 INJECTION INTRAMUSCULAR; INTRAVENOUS ONCE AS NEEDED
Status: COMPLETED | OUTPATIENT
Start: 2022-05-11 | End: 2022-05-11

## 2022-05-11 RX ORDER — ACETAMINOPHEN 500 MG
1000 TABLET ORAL ONCE
Status: COMPLETED | OUTPATIENT
Start: 2022-05-11 | End: 2022-05-11

## 2022-05-11 RX ORDER — DEXMEDETOMIDINE HYDROCHLORIDE 100 UG/ML
INJECTION, SOLUTION INTRAVENOUS AS NEEDED
Status: DISCONTINUED | OUTPATIENT
Start: 2022-05-11 | End: 2022-05-11 | Stop reason: SURG

## 2022-05-11 RX ADMIN — SODIUM CHLORIDE, POTASSIUM CHLORIDE, SODIUM LACTATE AND CALCIUM CHLORIDE 9 ML/HR: 600; 310; 30; 20 INJECTION, SOLUTION INTRAVENOUS at 09:46

## 2022-05-11 RX ADMIN — ACETAMINOPHEN 1000 MG: 500 TABLET ORAL at 10:04

## 2022-05-11 RX ADMIN — FENTANYL CITRATE 50 MCG: 50 INJECTION INTRAMUSCULAR; INTRAVENOUS at 11:10

## 2022-05-11 RX ADMIN — FAMOTIDINE 20 MG: 10 INJECTION INTRAVENOUS at 09:46

## 2022-05-11 RX ADMIN — PROPOFOL 100 MG: 10 INJECTION, EMULSION INTRAVENOUS at 11:11

## 2022-05-11 RX ADMIN — KETOROLAC TROMETHAMINE 30 MG: 30 INJECTION, SOLUTION INTRAMUSCULAR; INTRAVENOUS at 11:24

## 2022-05-11 RX ADMIN — DEXMEDETOMIDINE 8 MCG: 100 INJECTION, SOLUTION, CONCENTRATE INTRAVENOUS at 10:56

## 2022-05-11 RX ADMIN — LIDOCAINE HYDROCHLORIDE 60 MG: 20 INJECTION, SOLUTION INFILTRATION; PERINEURAL at 10:56

## 2022-05-11 RX ADMIN — PROPOFOL 75 MCG/KG/MIN: 10 INJECTION, EMULSION INTRAVENOUS at 10:58

## 2022-05-11 RX ADMIN — KETAMINE HYDROCHLORIDE 20 MG: 10 INJECTION, SOLUTION INTRAMUSCULAR; INTRAVENOUS at 10:56

## 2022-05-11 RX ADMIN — ONDANSETRON 4 MG: 2 INJECTION INTRAMUSCULAR; INTRAVENOUS at 09:46

## 2022-05-11 RX ADMIN — PROPOFOL 50 MG: 10 INJECTION, EMULSION INTRAVENOUS at 10:56

## 2022-05-11 RX ADMIN — DEXAMETHASONE SODIUM PHOSPHATE 8 MG: 4 INJECTION, SOLUTION INTRAMUSCULAR; INTRAVENOUS at 09:46

## 2022-05-11 NOTE — ANESTHESIA PREPROCEDURE EVALUATION
Anesthesia Evaluation     Patient summary reviewed and Nursing notes reviewed   no history of anesthetic complications:  NPO Solid Status: > 8 hours  NPO Liquid Status: > 2 hours           Airway   Mallampati: II  TM distance: >3 FB  Neck ROM: full  No difficulty expected  Dental - normal exam     Pulmonary - negative pulmonary ROS and normal exam    breath sounds clear to auscultation  Cardiovascular - negative cardio ROS and normal exam    Rhythm: regular  Rate: normal        Neuro/Psych- negative ROS  GI/Hepatic/Renal/Endo    (+)  GERD well controlled,  thyroid problem hypothyroidism    Musculoskeletal     (+) neck pain,   Abdominal  - normal exam   Substance History      OB/GYN negative ob/gyn ROS         Other   arthritis (knees),      ROS/Med Hx Other: Water with meds 0800                  Anesthesia Plan    ASA 2     MAC   (MAC with progression to GA as needed-discussed with patient and daughter-accepted)  intravenous induction     Anesthetic plan, all risks, benefits, and alternatives have been provided, discussed and informed consent has been obtained with: patient and child.  Use of blood products discussed with patient and child  Consented to blood products.       CODE STATUS:

## 2022-05-11 NOTE — OP NOTE
Operative Note    Date of Service:  05/11/22  Time of Service:  11:43 EDT    Surgical Staff: Surgeon(s) and Role:     * Lavern Miner, DO - Primary   Additional Staff: none   Pre-operative diagnosis(es): Pre-Op Diagnosis Codes:     * PMB (postmenopausal bleeding) [N95.0]     Post-operative diagnosis(es): Post-Op Diagnosis Codes:     * PMB (postmenopausal bleeding) [N95.0]   Procedure(s): Procedure(s):  DILATATION AND CURETTAGE HYSTEROSCOPY     Antibiotics: none ordered on call to OR     Anesthesia: Type: Choice  ASA:  II     Objective      Operative findings:  Cervical polyp appreciated.  Scant endometrial tissue appreciated.  No endometrial polyps.   Specimens removed: ID Type Source Tests Collected by Time   A :  Tissue Endometrial Curettings TISSUE PATHOLOGY EXAM Lavern Miner,  5/11/2022 1117   B :  Polyp Cervix TISSUE PATHOLOGY EXAM Lavern Miner,  5/11/2022 1117      Fluid Intake: 300mL   Output: Documented Output  Est. Blood Loss 1mL  Urine Output 500mL      I/O this shift:  In: 300 [I.V.:300]  Out: 500 [Urine:500]     Blood products used: No   Drains: * No LDAs found *   Implant Information: Nothing was implanted during the procedure   Complications:  None apparent   Intraoperative consult(s):    Condition: stable   Disposition: to PACU and then admit to  Home         Assessment & Plan     56-year-old with no menstrual cycle for the last 5 years presented to the office complaining of postmenopausal bleeding.  Transvaginal ultrasound was performed revealing an endometrial lining of 7 mm.  Normal ovaries bilaterally.  An endometrial biopsy was attempted in the office but unsuccessful due to stenosis of the cervix.  Discussed proceeding with a hysteroscopy with D&C to evaluate the endometrium.  Procedure reviewed with the patient and all questions answered.    After all questions were answered, the patient was taken the operating room and placed under general anesthesia.  She was carefully placed  in the dorsolithotomy position and prepped and draped in normal sterile fashion.  Speculum was placed into the vagina for clear visualization of the cervix.  A single-tooth tenaculum was used to grasp the anterior lip of the cervix.  A cervical polyp was noted.  Polyp forceps were used to grasp the polyp and it was sent to pathology.  Bipolar cauterization was performed to destroy the base of the polyp.  The cervical os was gently dilated.  The uterus sounded to 7 cm.  The hysteroscope was then introduced and the endometrial cavity was distended with normal saline.  A survey of the patient's endometrial cavity revealed scant endometrial tissue.  No endometrial polyp was noted.  The hysteroscope was then removed.  Sharp curette was placed into the endometrial cavity and a sampling of the endometrium was made.  The specimen was sent to pathology.  At this point the procedure was deemed over.  All instrument removed from the patient's vagina.  Hemostasis was noted at the tenaculum site.  There was scant bleeding noted from the cervical os.  Patient tolerated procedure well.  There were no apparent complications.  Patient is currently in stable condition in postanesthesia recovery.

## 2022-05-11 NOTE — H&P
Patient Care Team:  Lavern Miner DO as PCP - General (Obstetrics and Gynecology)    Chief complaint PMPB       HPI: 56-year-old with no menstrual cycle for the last 5 years presented to the office complaining of postmenopausal bleeding.  Transvaginal ultrasound was performed revealing an endometrial lining of 7 mm.  Normal ovaries bilaterally.  An endometrial biopsy was attempted in the office but unsuccessful due to stenosis of the cervix.  Discussed proceeding with a hysteroscopy with D&C to evaluate the endometrium.  Procedure reviewed with the patient and all questions answered.    Debilities: None    Emotional Behavior: Appropriate    PMH:   Past Medical History:   Diagnosis Date   • Disease of thyroid gland    • GERD (gastroesophageal reflux disease)     uses otc as needed   • Urination frequency          PSH:   Past Surgical History:   Procedure Laterality Date   • THROAT SURGERY Bilateral 2020    2 cysts removed       SoHx:   Social History     Socioeconomic History   • Marital status: Single   Tobacco Use   • Smoking status: Never Smoker   • Smokeless tobacco: Never Used   Vaping Use   • Vaping Use: Never used   Substance and Sexual Activity   • Alcohol use: Yes     Comment: Social   • Drug use: No   • Sexual activity: Never       FHx:   Family History   Problem Relation Age of Onset   • Prostate cancer Father    • Diabetes Father    • Diabetes Mother    • Breast cancer Neg Hx    • Ovarian cancer Neg Hx    • Uterine cancer Neg Hx    • Colon cancer Neg Hx        PGyn Hx: LPS 2/17/22      Allergies: Eggs or egg-derived products, Latex, and Nuts    Medications:   No current facility-administered medications on file prior to encounter.     Current Outpatient Medications on File Prior to Encounter   Medication Sig Dispense Refill   • hydrOXYzine (ATARAX) 25 MG tablet Take 50 mg by mouth Daily.     • levothyroxine (SYNTHROID, LEVOTHROID) 50 MCG tablet Take 1 tablet by mouth Every Morning Before  Breakfast.     • melatonin 5 MG tablet tablet Take 10 mg by mouth Every Night. 20mg     • Cholecalciferol (Vitamin D3) 50 MCG (2000 UT) capsule Take 2,000 Units by mouth Daily.     • estradiol (ESTRACE VAGINAL) 0.1 MG/GM vaginal cream Insert 1gm vaginally x 2 weeks and then 2x per week (Patient taking differently: 2 g 1 (One) Time Per Week. Insert 1gm vaginally x 2 weeks and then 2x per week) 1 g 12   • progesterone (PROMETRIUM) 100 MG capsule Take 1 capsule by mouth Daily. (Patient taking differently: Take 100 mg by mouth Every Other Day.) 30 capsule 11             Vital Signs  Temp:  [98.6 °F (37 °C)] 98.6 °F (37 °C)  Heart Rate:  [72] 72  Resp:  [12] 12  BP: (147)/(67) 147/67    Physical Exam:  General: NAD  Heart:: RRR  Lungs: clear  Abdomen: soft, NT/ND  Genitalia: deferred to OR  Extremities: no calf tenderness  Psychological: AAOx3          Assessment/Plan: 56-year-old with postmenopausal bleeding.  Proceed with hysteroscopy with D&C.      I discussed the patients findings and my recommendations with patient and family.     Lavern Miner DO  05/11/22  10:08 EDT

## 2022-05-16 LAB
LAB AP CASE REPORT: NORMAL
PATH REPORT.FINAL DX SPEC: NORMAL
PATH REPORT.GROSS SPEC: NORMAL

## 2022-06-08 RX ORDER — PROGESTERONE 100 MG/1
CAPSULE ORAL
Qty: 90 CAPSULE | Refills: 2 | Status: SHIPPED | OUTPATIENT
Start: 2022-06-08 | End: 2023-02-21 | Stop reason: SDUPTHER

## 2023-02-21 ENCOUNTER — OFFICE VISIT (OUTPATIENT)
Dept: OBSTETRICS AND GYNECOLOGY | Facility: CLINIC | Age: 58
End: 2023-02-21
Payer: MEDICAID

## 2023-02-21 VITALS
BODY MASS INDEX: 31.29 KG/M2 | WEIGHT: 187.8 LBS | DIASTOLIC BLOOD PRESSURE: 82 MMHG | HEIGHT: 65 IN | SYSTOLIC BLOOD PRESSURE: 170 MMHG

## 2023-02-21 DIAGNOSIS — Z11.51 SPECIAL SCREENING EXAMINATION FOR HUMAN PAPILLOMAVIRUS (HPV): ICD-10-CM

## 2023-02-21 DIAGNOSIS — Z01.419 PAP SMEAR, LOW-RISK: ICD-10-CM

## 2023-02-21 DIAGNOSIS — Z01.419 ROUTINE GYNECOLOGICAL EXAMINATION: Primary | ICD-10-CM

## 2023-02-21 LAB
BILIRUB BLD-MCNC: NEGATIVE MG/DL
CLARITY, POC: CLEAR
COLOR UR: YELLOW
GLUCOSE UR STRIP-MCNC: NEGATIVE MG/DL
KETONES UR QL: NEGATIVE
LEUKOCYTE EST, POC: NEGATIVE
NITRITE UR-MCNC: NEGATIVE MG/ML
PH UR: 5 [PH] (ref 5–8)
PROT UR STRIP-MCNC: NEGATIVE MG/DL
RBC # UR STRIP: NEGATIVE /UL
SP GR UR: 1 (ref 1–1.03)
UROBILINOGEN UR QL: NORMAL

## 2023-02-21 PROCEDURE — 81002 URINALYSIS NONAUTO W/O SCOPE: CPT | Performed by: OBSTETRICS & GYNECOLOGY

## 2023-02-21 PROCEDURE — 99396 PREV VISIT EST AGE 40-64: CPT | Performed by: OBSTETRICS & GYNECOLOGY

## 2023-02-21 RX ORDER — PROGESTERONE 100 MG/1
100 CAPSULE ORAL DAILY
Qty: 90 CAPSULE | Refills: 3 | Status: SHIPPED | OUTPATIENT
Start: 2023-02-21

## 2023-02-21 RX ORDER — ESTRADIOL 0.1 MG/G
CREAM VAGINAL
Qty: 1 G | Refills: 12 | Status: SHIPPED | OUTPATIENT
Start: 2023-02-21

## 2023-02-21 NOTE — PROGRESS NOTES
GYN Annual Exam     CC- Here for annual exam.     Monica Olguin is a 57 y.o. female who presents for annual well woman exam.  Pt had  PMPB 2022: TVUS ordered today and reveals: AV uterus with smooth borders. EM lining 7mm. Normal ovaries bilaterally. No free fluid. Results reviewed with pt. EM bx attempted but unsuccessful due to stenosis of cervix. S/p hysteroscopy D and C in 2022 with benign pathology. Pt reports no PMPB. She is on Prometrium 100mg po qhs. Pt using Estrace cream 1x per week.     OB History        2    Para   2    Term   2            AB        Living   2       SAB        IAB        Ectopic        Molar        Multiple        Live Births   2                Current contraception: post menopausal status  History of abnormal Pap smear: no  History of abnormal mammogram: no  Family history of uterine, colon or ovarian cancer: no  Family history of breast cancer: no    Health Maintenance   Topic Date Due   • URINE MICROALBUMIN  Never done   • COLORECTAL CANCER SCREENING  Never done   • Pneumococcal Vaccine 0-64 (1 - PCV) Never done   • ZOSTER VACCINE (1 of 2) Never done   • HEPATITIS C SCREENING  Never done   • ANNUAL PHYSICAL  Never done   • DIABETIC FOOT EXAM  Never done   • HEMOGLOBIN A1C  Never done   • DIABETIC EYE EXAM  Never done   • COVID-19 Vaccine (4 - Booster for Pfizer series) 2021   • Annual Gynecologic Pelvic and Breast Exam  2023   • MAMMOGRAM  2024   • PAP SMEAR  2025   • TDAP/TD VACCINES (2 - Td or Tdap) 2031   • INFLUENZA VACCINE  Completed       Past Medical History:   Diagnosis Date   • Disease of thyroid gland    • GERD (gastroesophageal reflux disease)     uses otc as needed   • Urination frequency        Past Surgical History:   Procedure Laterality Date   • D & C HYSTEROSCOPY N/A 2022    Procedure: DILATATION AND CURETTAGE HYSTEROSCOPY;  Surgeon: Lavern Miner DO;  Location: [facility] OR;  Service: Obstetrics/Gynecology;   "Laterality: N/A;   • THROAT SURGERY Bilateral 2020    2 cysts removed         Current Outpatient Medications:   •  estradiol (ESTRACE VAGINAL) 0.1 MG/GM vaginal cream, Insert 1gm vaginally 1x per week, Disp: 1 g, Rfl: 12  •  Progesterone (PROMETRIUM) 100 MG capsule, Take 1 capsule by mouth Daily., Disp: 90 capsule, Rfl: 3  •  Cholecalciferol (Vitamin D3) 50 MCG (2000 UT) capsule, Take 2,000 Units by mouth Daily., Disp: , Rfl:   •  hydrOXYzine (ATARAX) 25 MG tablet, Take 50 mg by mouth Daily., Disp: , Rfl:   •  levothyroxine (SYNTHROID, LEVOTHROID) 50 MCG tablet, Take 1 tablet by mouth Every Morning Before Breakfast., Disp: , Rfl:   •  melatonin 5 MG tablet tablet, Take 10 mg by mouth Every Night. 20mg, Disp: , Rfl:   •  progesterone (PROMETRIUM) 100 MG capsule, Take 1 capsule by mouth Daily. (Patient taking differently: Take 100 mg by mouth Every Other Day.), Disp: 30 capsule, Rfl: 11    Allergies   Allergen Reactions   • Eggs Or Egg-Derived Products Shortness Of Breath and Swelling   • Latex Other (See Comments)     blisters   • Nuts Rash       Social History     Tobacco Use   • Smoking status: Never   • Smokeless tobacco: Never   Vaping Use   • Vaping Use: Never used   Substance Use Topics   • Alcohol use: Yes     Comment: Social   • Drug use: No       Family History   Problem Relation Age of Onset   • Prostate cancer Father    • Diabetes Father    • Diabetes Mother    • Breast cancer Neg Hx    • Ovarian cancer Neg Hx    • Uterine cancer Neg Hx    • Colon cancer Neg Hx        Review of Systems   Constitutional: Negative for appetite change, chills, fatigue, fever and unexpected weight change.   Gastrointestinal: Negative for abdominal distention, abdominal pain, anal bleeding, blood in stool, constipation, diarrhea, nausea and vomiting.   Genitourinary: Negative for dyspareunia, dysuria, menstrual problem, pelvic pain, vaginal bleeding, vaginal discharge and vaginal pain.       /82   Ht 165.1 cm (65\")   Wt " 85.2 kg (187 lb 12.8 oz)   LMP 06/01/2017 (Exact Date)   BMI 31.25 kg/m²     Physical Exam  Vitals reviewed.   Constitutional:       General: She is not in acute distress.     Appearance: Normal appearance. She is well-developed. She is not ill-appearing, toxic-appearing or diaphoretic.   HENT:      Mouth/Throat:      Dentition: Normal dentition. No dental caries.   Cardiovascular:      Rate and Rhythm: Normal rate and regular rhythm.      Heart sounds: Normal heart sounds.   Pulmonary:      Effort: Pulmonary effort is normal. No respiratory distress.      Breath sounds: Normal breath sounds. No stridor. No wheezing.   Chest:   Breasts:     Right: No inverted nipple, mass, nipple discharge, skin change or tenderness.      Left: No inverted nipple, mass, nipple discharge, skin change or tenderness.   Abdominal:      General: There is no distension.      Palpations: Abdomen is soft. There is no mass.      Tenderness: There is no abdominal tenderness.   Genitourinary:     General: Normal vulva.      Labia:         Right: No rash, tenderness or lesion.         Left: No rash, tenderness or lesion.       Vagina: No vaginal discharge, tenderness or bleeding.      Cervix: No cervical motion tenderness, discharge or friability.      Uterus: Not deviated, not enlarged, not fixed and not tender.       Adnexa:         Right: No mass, tenderness or fullness.          Left: No mass, tenderness or fullness.     Musculoskeletal:         General: No tenderness. Normal range of motion.   Skin:     General: Skin is warm.      Findings: No erythema or rash.   Neurological:      General: No focal deficit present.      Mental Status: She is alert and oriented to person, place, and time. Mental status is at baseline.      Cranial Nerves: No cranial nerve deficit.      Motor: No weakness.      Coordination: Coordination normal.      Gait: Gait normal.   Psychiatric:         Mood and Affect: Mood normal.         Behavior: Behavior normal.          Thought Content: Thought content normal.         Judgment: Judgment normal.            Assessment/Plan    1) GYN HM: check pap smear. MMG ordered. Last MMG 3/2023. SBE demonstrated and encouraged. Colonoscopy UTD.  2) : menopause age 50-51 years. Pt doing well with Prometrium 100mg po qhs for insomnia.- refills given.  3) Bone health - Weight bearing exercise, dietary calcium recommendations and vitamin D reviewed.   4) Diet and Exercise discussed  5) Smoking Status: nonsmoker  6) Social: pt's daughter is Dr Altmanda Ramy  7) vaginal atrophy: Rx Estrace cream 1x per week  8) Follow up prn and 1 year       Diagnoses and all orders for this visit:    Routine gynecological examination  -     POC Urinalysis Dipstick  -     IGP, Apt HPV,rfx 16 / 18,45  -     Mammo Screening Bilateral With CAD; Future    Pap smear, low-risk  -     IGP, Apt HPV,rfx 16 / 18,45    Special screening examination for human papillomavirus (HPV)  -     IGP, Apt HPV,rfx 16 / 18,45    Other orders  -     estradiol (ESTRACE VAGINAL) 0.1 MG/GM vaginal cream; Insert 1gm vaginally 1x per week  -     Progesterone (PROMETRIUM) 100 MG capsule; Take 1 capsule by mouth Daily.        Lavern Miner, DO  2/21/2023  14:21 EST

## 2023-03-18 ENCOUNTER — HOSPITAL ENCOUNTER (OUTPATIENT)
Dept: MAMMOGRAPHY | Facility: HOSPITAL | Age: 58
Discharge: HOME | End: 2023-03-18
Admitting: OBSTETRICS & GYNECOLOGY
Payer: MEDICAID

## 2023-03-18 DIAGNOSIS — Z01.419 ROUTINE GYNECOLOGICAL EXAMINATION: ICD-10-CM

## 2023-03-18 PROCEDURE — 77063 BREAST TOMOSYNTHESIS BI: CPT

## 2023-03-18 PROCEDURE — 77067 SCR MAMMO BI INCL CAD: CPT

## 2023-09-20 ENCOUNTER — OFFICE (OUTPATIENT)
Dept: URBAN - METROPOLITAN AREA CLINIC 76 | Facility: CLINIC | Age: 58
End: 2023-09-20
Payer: MEDICAID

## 2023-09-20 VITALS
HEART RATE: 66 BPM | SYSTOLIC BLOOD PRESSURE: 130 MMHG | HEIGHT: 65 IN | DIASTOLIC BLOOD PRESSURE: 70 MMHG | WEIGHT: 182 LBS | OXYGEN SATURATION: 95 %

## 2023-09-20 DIAGNOSIS — K76.0 FATTY (CHANGE OF) LIVER, NOT ELSEWHERE CLASSIFIED: ICD-10-CM

## 2023-09-20 DIAGNOSIS — K58.9 IRRITABLE BOWEL SYNDROME WITHOUT DIARRHEA: ICD-10-CM

## 2023-09-20 DIAGNOSIS — R68.81 EARLY SATIETY: ICD-10-CM

## 2023-09-20 DIAGNOSIS — K21.9 GASTRO-ESOPHAGEAL REFLUX DISEASE WITHOUT ESOPHAGITIS: ICD-10-CM

## 2023-09-20 PROCEDURE — 99204 OFFICE O/P NEW MOD 45 MIN: CPT | Performed by: INTERNAL MEDICINE

## 2023-09-20 RX ORDER — FAMOTIDINE 40 MG/1
40 TABLET, FILM COATED ORAL
Qty: 30 | Refills: 11 | Status: ACTIVE
Start: 2023-09-20

## 2024-01-26 ENCOUNTER — OFFICE (OUTPATIENT)
Dept: URBAN - METROPOLITAN AREA CLINIC 76 | Facility: CLINIC | Age: 59
End: 2024-01-26
Payer: MEDICAID

## 2024-01-26 VITALS
WEIGHT: 182 LBS | SYSTOLIC BLOOD PRESSURE: 122 MMHG | HEIGHT: 65 IN | HEART RATE: 70 BPM | DIASTOLIC BLOOD PRESSURE: 80 MMHG

## 2024-01-26 DIAGNOSIS — K76.0 FATTY (CHANGE OF) LIVER, NOT ELSEWHERE CLASSIFIED: ICD-10-CM

## 2024-01-26 DIAGNOSIS — K58.9 IRRITABLE BOWEL SYNDROME WITHOUT DIARRHEA: ICD-10-CM

## 2024-01-26 DIAGNOSIS — K21.9 GASTRO-ESOPHAGEAL REFLUX DISEASE WITHOUT ESOPHAGITIS: ICD-10-CM

## 2024-01-26 PROCEDURE — 99214 OFFICE O/P EST MOD 30 MIN: CPT | Performed by: NURSE PRACTITIONER

## 2024-03-06 ENCOUNTER — OFFICE VISIT (OUTPATIENT)
Dept: OBSTETRICS AND GYNECOLOGY | Facility: CLINIC | Age: 59
End: 2024-03-06
Payer: MEDICAID

## 2024-03-06 VITALS
DIASTOLIC BLOOD PRESSURE: 70 MMHG | WEIGHT: 181.6 LBS | HEIGHT: 65 IN | BODY MASS INDEX: 30.26 KG/M2 | SYSTOLIC BLOOD PRESSURE: 118 MMHG

## 2024-03-06 DIAGNOSIS — Z79.890 HORMONE REPLACEMENT THERAPY (HRT): ICD-10-CM

## 2024-03-06 DIAGNOSIS — Z01.419 ROUTINE GYNECOLOGICAL EXAMINATION: Primary | ICD-10-CM

## 2024-03-06 DIAGNOSIS — R68.81 EARLY SATIETY: ICD-10-CM

## 2024-03-06 DIAGNOSIS — Z12.31 ENCOUNTER FOR SCREENING MAMMOGRAM FOR MALIGNANT NEOPLASM OF BREAST: ICD-10-CM

## 2024-03-06 DIAGNOSIS — Z76.89 ENCOUNTER TO ESTABLISH CARE WITH NEW DOCTOR: ICD-10-CM

## 2024-03-06 DIAGNOSIS — Z13.820 SCREENING FOR OSTEOPOROSIS: ICD-10-CM

## 2024-03-06 DIAGNOSIS — N95.2 VAGINAL ATROPHY: ICD-10-CM

## 2024-03-06 RX ORDER — QUETIAPINE FUMARATE 25 MG/1
TABLET, FILM COATED ORAL
COMMUNITY
Start: 2024-02-28

## 2024-03-06 RX ORDER — DESVENLAFAXINE 25 MG/1
TABLET, EXTENDED RELEASE ORAL
COMMUNITY
Start: 2023-12-22

## 2024-03-06 RX ORDER — BACILLUS COAGULANS/INULIN 1B-250 MG
CAPSULE ORAL
COMMUNITY

## 2024-03-06 RX ORDER — B-COMPLEX WITH VITAMIN C
TABLET ORAL
COMMUNITY

## 2024-03-06 RX ORDER — FAMOTIDINE 40 MG/1
1 TABLET, FILM COATED ORAL 2 TIMES DAILY
COMMUNITY
Start: 2023-12-18

## 2024-03-06 RX ORDER — ESTRADIOL 0.1 MG/G
CREAM VAGINAL
Qty: 1 G | Refills: 12 | Status: SHIPPED | OUTPATIENT
Start: 2024-03-06

## 2024-03-06 RX ORDER — LEVOTHYROXINE SODIUM 0.07 MG/1
75 TABLET ORAL DAILY
COMMUNITY
Start: 2024-02-23

## 2024-03-06 RX ORDER — PROGESTERONE 100 MG/1
100 CAPSULE ORAL DAILY
Qty: 90 CAPSULE | Refills: 3 | Status: SHIPPED | OUTPATIENT
Start: 2024-03-06

## 2024-03-06 RX ORDER — TRIAMCINOLONE ACETONIDE 0.25 MG/G
OINTMENT TOPICAL
COMMUNITY
Start: 2024-02-01

## 2024-03-13 NOTE — ANESTHESIA POSTPROCEDURE EVALUATION
Patient: Monica Olguin    Procedure Summary     Date: 05/11/22 Room / Location: [facility] OR 3 /  [facility] OR    Anesthesia Start: 1050 Anesthesia Stop: 1134    Procedure: DILATATION AND CURETTAGE HYSTEROSCOPY (N/A Uterus) Diagnosis:       PMB (postmenopausal bleeding)      (PMB (postmenopausal bleeding) [N95.0])    Surgeons: Lavern Miner DO Provider: Vickie Jeffrey CRNA    Anesthesia Type: MAC ASA Status: 2          Anesthesia Type: MAC    Vitals  Vitals Value Taken Time   /62 05/11/22 1220   Temp 98.7 °F (37.1 °C) 05/11/22 1132   Pulse 59 05/11/22 1220   Resp 14 05/11/22 1220   SpO2 97 % 05/11/22 1220           Post Anesthesia Care and Evaluation    Patient location during evaluation: PHASE II  Patient participation: complete - patient participated  Level of consciousness: awake and alert  Pain score: 0  Pain management: satisfactory to patient  Airway patency: patent  Anesthetic complications: No anesthetic complications  PONV Status: none  Cardiovascular status: acceptable  Respiratory status: acceptable  Hydration status: acceptable      
Universal Safety Interventions

## 2024-03-19 ENCOUNTER — HOSPITAL ENCOUNTER (OUTPATIENT)
Dept: MAMMOGRAPHY | Facility: HOSPITAL | Age: 59
Discharge: HOME | End: 2024-03-19
Payer: MEDICAID

## 2024-03-19 ENCOUNTER — HOSPITAL ENCOUNTER (OUTPATIENT)
Dept: BONE DENSITY | Facility: HOSPITAL | Age: 59
Discharge: HOME | End: 2024-03-19
Payer: MEDICAID

## 2024-03-19 DIAGNOSIS — Z13.820 SCREENING FOR OSTEOPOROSIS: ICD-10-CM

## 2024-03-19 DIAGNOSIS — Z12.31 ENCOUNTER FOR SCREENING MAMMOGRAM FOR MALIGNANT NEOPLASM OF BREAST: ICD-10-CM

## 2024-03-19 PROCEDURE — 77067 SCR MAMMO BI INCL CAD: CPT

## 2024-03-19 PROCEDURE — 77080 DXA BONE DENSITY AXIAL: CPT

## 2024-03-19 PROCEDURE — 77063 BREAST TOMOSYNTHESIS BI: CPT

## 2024-03-19 NOTE — PROGRESS NOTES
PIP= DEXA shows osteopenia, however, she still does not meet the criteria for treatment with osteoporosis medication. Rec daily calcium and vit D intake along with weight bearing exercises.There are recommendations for calcium intake of 1200 mg daily for postmenopausal women.  This calcium should preferably come from your diet because it is better absorbed. Vitamin D helps the body absorb calcium. What is not obtained from diet should be supplemented in small doses during the day with D at meals. Also weight-bearing exercise helps with bone density. Walking is a great example. All this can help reduce your risk of fracture if you have an accident or fall.  Repeat DEXA in 2-3 years.

## 2024-04-02 ENCOUNTER — TELEPHONE (OUTPATIENT)
Dept: OBSTETRICS AND GYNECOLOGY | Facility: CLINIC | Age: 59
End: 2024-04-02

## 2024-04-02 NOTE — TELEPHONE ENCOUNTER
Caller: Monica Olguin    Relationship: Self    Best call back number: [phone]    What orders are you requesting (i.e. lab or imaging): BREAST U/S    In what timeframe would the patient need to come in: BREAST U/S    Where will you receive your lab/imaging services: [facility] LOCATION    Additional notes: PT STATES SHE HAD A MAMMOGRAM A COUPLE OF WEEKS BACK AND HAS HAD BREAST PAIN SINCE, WOULD LIKE TO GET A BREAST U/S TO HAVE THIS CHECKED    PLEASE CALL PT TO ADVISE/DISCUSS

## 2024-04-02 NOTE — TELEPHONE ENCOUNTER
Caller: Monica Olguin    Relationship to patient: Self    Best call back number: [phone] (home)  CAN CALL BACK    PT CALLED BACK TO CHECK ON STATUS OF BREAST U/S ORDER.

## 2024-04-03 ENCOUNTER — TELEPHONE (OUTPATIENT)
Dept: OBSTETRICS AND GYNECOLOGY | Facility: CLINIC | Age: 59
End: 2024-04-03
Payer: COMMERCIAL

## 2024-04-03 DIAGNOSIS — N64.4 MASTODYNIA: Primary | ICD-10-CM

## 2024-04-03 NOTE — TELEPHONE ENCOUNTER
Addended by: ADELE JENSEN on: 6/11/2020 10:59 AM     Modules accepted: Orders     I called and spoke with pt and she has had R breast pain since her MMG and is requesting an US. Please arrange R breast US for mastodynia. Thanks AKMARIAH

## 2024-04-22 ENCOUNTER — TELEPHONE (OUTPATIENT)
Dept: OBSTETRICS AND GYNECOLOGY | Facility: CLINIC | Age: 59
End: 2024-04-22

## 2024-04-22 NOTE — TELEPHONE ENCOUNTER
Caller: Monica Olguin    Relationship: Self    Best call back number: [phone]    What orders are you requesting (i.e. lab or imaging): BREAST U/S    In what timeframe would the patient need to come in: ASAP    Where will you receive your lab/imaging services: [facility] @ [address]    Additional notes: PT'S INSURANCE IS ONLY ACCEPTED BY [facility] - PT WISHES TO HAVE U/S DONE IN [place] AND CAN ONLY DO THAT AT [facility]    PLEASE CALL PT IF ANY QUESTIONS

## 2024-04-23 ENCOUNTER — HOSPITAL ENCOUNTER (OUTPATIENT)
Dept: ULTRASOUND IMAGING | Facility: HOSPITAL | Age: 59
End: 2024-04-23
Payer: COMMERCIAL

## 2024-04-25 ENCOUNTER — TELEPHONE (OUTPATIENT)
Dept: OBSTETRICS AND GYNECOLOGY | Facility: CLINIC | Age: 59
End: 2024-04-25
Payer: COMMERCIAL

## 2024-04-25 DIAGNOSIS — N64.4 MASTODYNIA: Primary | ICD-10-CM

## 2024-04-25 NOTE — TELEPHONE ENCOUNTER
[facility] is asking for a Diagnostic mammogram to go along with breast US that was ordered. Can you please place order?

## 2024-05-08 ENCOUNTER — TELEPHONE (OUTPATIENT)
Dept: OBSTETRICS AND GYNECOLOGY | Facility: CLINIC | Age: 59
End: 2024-05-08

## 2024-05-08 NOTE — TELEPHONE ENCOUNTER
Caller: Monica Olguin    Relationship: Self    Best call back number: [phone]    What orders are you requesting (i.e. lab or imaging): BREAST U/S    In what timeframe would the patient need to come in: ASAP    Where will you receive your lab/imaging services: [facility] / [facility]    Additional notes: PT'S INSURANCE HAS CHANGED AND SHE WOULD LIKE TO HAVE THIS U/S DONE AT [facility] NOW - CAN THE ORDER BE UPDATED BACK TO [facility]    PLEASE CALL PT TO CONFIRM

## 2024-05-09 ENCOUNTER — TELEPHONE (OUTPATIENT)
Dept: OBSTETRICS AND GYNECOLOGY | Facility: CLINIC | Age: 59
End: 2024-05-09
Payer: MEDICAID

## 2024-05-09 DIAGNOSIS — N64.4 MASTODYNIA: Primary | ICD-10-CM

## 2024-06-13 ENCOUNTER — HOSPITAL ENCOUNTER (OUTPATIENT)
Dept: MAMMOGRAPHY | Facility: HOSPITAL | Age: 59
Discharge: HOME | End: 2024-06-13
Admitting: OBSTETRICS & GYNECOLOGY
Payer: MEDICAID

## 2024-06-13 ENCOUNTER — HOSPITAL ENCOUNTER (OUTPATIENT)
Dept: ULTRASOUND IMAGING | Facility: HOSPITAL | Age: 59
End: 2024-06-13
Payer: MEDICAID

## 2024-06-13 DIAGNOSIS — N64.4 MASTODYNIA: ICD-10-CM

## 2024-06-13 PROCEDURE — 77065 DX MAMMO INCL CAD UNI: CPT

## 2024-06-13 PROCEDURE — G0279 TOMOSYNTHESIS, MAMMO: HCPCS

## 2024-06-14 ENCOUNTER — HOSPITAL ENCOUNTER (OUTPATIENT)
Dept: ULTRASOUND IMAGING | Facility: HOSPITAL | Age: 59
Discharge: HOME | End: 2024-06-14
Payer: MEDICAID

## 2024-06-14 DIAGNOSIS — N64.4 MASTODYNIA: ICD-10-CM

## 2024-06-14 PROCEDURE — 76642 ULTRASOUND BREAST LIMITED: CPT

## 2025-03-04 RX ORDER — PROGESTERONE 100 MG/1
100 CAPSULE ORAL DAILY
Qty: 90 CAPSULE | Refills: 1 | Status: SHIPPED | OUTPATIENT
Start: 2025-03-04

## 2025-04-09 ENCOUNTER — TRANSCRIBE ORDERS (OUTPATIENT)
Dept: DIABETES SERVICES | Facility: HOSPITAL | Age: 60
End: 2025-04-09
Payer: MEDICAID

## 2025-04-09 DIAGNOSIS — K76.0 FATTY LIVER: ICD-10-CM

## 2025-04-09 DIAGNOSIS — R73.03 PRE-DIABETES: Primary | ICD-10-CM

## 2025-06-10 RX ORDER — ESTRADIOL 0.1 MG/G
CREAM VAGINAL
Qty: 42.5 G | Refills: 0 | Status: SHIPPED | OUTPATIENT
Start: 2025-06-10 | End: 2025-06-16

## 2025-06-16 ENCOUNTER — OFFICE VISIT (OUTPATIENT)
Dept: OBSTETRICS AND GYNECOLOGY | Facility: CLINIC | Age: 60
End: 2025-06-16
Payer: MEDICAID

## 2025-06-16 VITALS — WEIGHT: 175 LBS | BODY MASS INDEX: 29.16 KG/M2 | HEIGHT: 65 IN

## 2025-06-16 DIAGNOSIS — Z12.31 ENCOUNTER FOR SCREENING MAMMOGRAM FOR MALIGNANT NEOPLASM OF BREAST: ICD-10-CM

## 2025-06-16 DIAGNOSIS — N95.2 VAGINAL ATROPHY: ICD-10-CM

## 2025-06-16 DIAGNOSIS — Z13.89 SCREENING FOR GENITOURINARY CONDITION: ICD-10-CM

## 2025-06-16 DIAGNOSIS — R10.32 LLQ PAIN: Primary | ICD-10-CM

## 2025-06-16 LAB
BILIRUB BLD-MCNC: NEGATIVE MG/DL
CLARITY, POC: CLEAR
COLOR UR: YELLOW
GLUCOSE UR STRIP-MCNC: NEGATIVE MG/DL
KETONES UR QL: NEGATIVE
LEUKOCYTE EST, POC: NEGATIVE
NITRITE UR-MCNC: NEGATIVE MG/ML
PH UR: 6.5 [PH] (ref 5–8)
PROT UR STRIP-MCNC: NEGATIVE MG/DL
RBC # UR STRIP: NEGATIVE /UL
SP GR UR: 1 (ref 1–1.03)
UROBILINOGEN UR QL: NORMAL

## 2025-06-16 RX ORDER — ESTRADIOL 10 UG/1
1 TABLET, FILM COATED VAGINAL 2 TIMES WEEKLY
Qty: 8 TABLET | Refills: 3 | Status: SHIPPED | OUTPATIENT
Start: 2025-06-16

## 2025-06-16 NOTE — PROGRESS NOTES
TALIA Olguin is a 59 y.o. patient who presents for follow up of   Chief Complaint   Patient presents with    Pelvic Pain     Pt c/o pain on LT side is worse x 10 days     60 yo est pt here for emergency appt for LLQ pain. She has had intermittent LLQ pain for the past 10 days. It comes and goes and is sharp and can awaken her from sleep. She has had a low grade temp, no N/V/C/D. No VB. No new dietary changes. She is not SA at present. Her US today shows a 7.1 cm AV uterus with an EL= 0.7 cm. Her ovaries appear normal. Her US was compared to her last scan on 3/6/2024. Her pain is no reproducible on exam today. We will check labs and do a stat CT scan. We discussed the DDX  and that this is not likely to be gyn in nature. If her pain worsens and/or fails to improve, she will nee to f/u with her primary care MD or the ER. She needs to use vaginal E2 but finds the cream messy so we will change to Vagifem.         The following portions of the patient's history were reviewed and updated as appropriate: allergies, current medications and problem list.    Review of Systems   Constitutional:  Positive for activity change and chills. Negative for appetite change, fatigue, fever and unexpected weight change.   Eyes:  Negative for photophobia and visual disturbance.   Respiratory:  Negative for cough and shortness of breath.    Cardiovascular:  Negative for chest pain and palpitations.   Gastrointestinal:  Positive for abdominal pain. Negative for abdominal distention, anal bleeding, blood in stool, constipation, diarrhea, nausea, rectal pain and vomiting.   Endocrine: Negative for cold intolerance and heat intolerance.   Genitourinary:  Positive for pelvic pain. Negative for difficulty urinating, dyspareunia, dysuria, menstrual problem, vaginal bleeding, vaginal discharge and vaginal pain.   Musculoskeletal:  Negative for back pain.   Skin:  Negative for color change and rash.   Neurological:  Negative for headaches.  "  Hematological:  Negative for adenopathy. Does not bruise/bleed easily.   Psychiatric/Behavioral:  Negative for dysphoric mood. The patient is not nervous/anxious.        Ht 165.1 cm (65\")   Wt 79.4 kg (175 lb)   LMP 06/01/2017 (Exact Date)   BMI 29.12 kg/m²     Physical Exam  Vitals and nursing note reviewed. Exam conducted with a chaperone present.   Constitutional:       Appearance: Normal appearance. She is well-developed.   HENT:      Head: Normocephalic and atraumatic.   Eyes:      General: No scleral icterus.     Conjunctiva/sclera: Conjunctivae normal.   Neck:      Thyroid: No thyromegaly.   Abdominal:      General: Bowel sounds are normal. There is no distension.      Palpations: Abdomen is soft. There is no mass.      Tenderness: There is no abdominal tenderness. There is no guarding or rebound.      Hernia: No hernia is present.   Genitourinary:     General: Normal vulva.      Vagina: Normal.      Cervix: Normal.      Uterus: Normal.       Adnexa: Right adnexa normal and left adnexa normal.      Comments: Mild atrophy  Pain not reproducible on exam.  Skin:     General: Skin is warm and dry.   Neurological:      Mental Status: She is alert and oriented to person, place, and time.   Psychiatric:         Behavior: Behavior normal.         Thought Content: Thought content normal.         Judgment: Judgment normal.         A/P:  1. LLQ pain- check CBC, CMP. Stat CT A/P w and w/o   2. Patient counseled that vaginal estrogen rings, creams and tablets are available and highly effective at treating local vaginal symptoms such as atrophy and vaginal dryness.  Vaginal estrogen does not cause uterine overgrowth and does not require a progestogen to protect the uterus.  Very small amounts of estrogen are absorbed systemically.  For patients with a history of an estrogen dependent cancer such as breast cancer, the decision to use local estrogen for local vaginal symptoms should be made after consultation with her " oncologist.  Possible side effects include local irritation or burning and/or vaginal bleeding and should always be reported.   ERX Vagifem x2/week  3. MMG UTD 6/2024 B1. Schedule MMG now  4. DEXA_ UTD 3/2024- osteopenia, ROF 11 & 0.7 %.   5. Keep annual exam next month for f/u.   6. Cscope UTD until 2029  7. EPIC LOS calculator used to determine coding level.       Assessment & Plan   Diagnoses and all orders for this visit:    1. LLQ pain (Primary)  -     CBC & Differential  -     Comprehensive Metabolic Panel  -     CT Abdomen Pelvis With Contrast; Future    2. Screening for genitourinary condition  -     POC Urinalysis Dipstick    3. Encounter for screening mammogram for malignant neoplasm of breast  -     Mammo Screening Digital Tomosynthesis Bilateral With CAD; Future    4. Vaginal atrophy    Other orders  -     estradiol (VAGIFEM) 10 MCG tablet vaginal tablet; Insert 1 tablet into the vagina 2 (Two) Times a Week.  Dispense: 8 tablet; Refill: 3                 No follow-ups on file.      Nunu Smith MD    6/16/2025  11:59 EDT

## 2025-06-17 ENCOUNTER — HOSPITAL ENCOUNTER (OUTPATIENT)
Dept: CT IMAGING | Facility: HOSPITAL | Age: 60
Discharge: HOME | End: 2025-06-17
Admitting: OBSTETRICS & GYNECOLOGY
Payer: MEDICAID

## 2025-06-17 DIAGNOSIS — R10.32 LLQ PAIN: ICD-10-CM

## 2025-06-17 LAB
ALBUMIN SERPL-MCNC: 4.4 G/DL (ref 3.8–4.9)
ALP SERPL-CCNC: 70 IU/L (ref 44–121)
ALT SERPL-CCNC: 26 IU/L (ref 0–32)
AST SERPL-CCNC: 26 IU/L (ref 0–40)
BASOPHILS # BLD AUTO: 0.1 X10E3/UL (ref 0–0.2)
BASOPHILS NFR BLD AUTO: 1 %
BILIRUB SERPL-MCNC: 0.2 MG/DL (ref 0–1.2)
BUN SERPL-MCNC: 13 MG/DL (ref 6–24)
BUN/CREAT SERPL: 19 (ref 9–23)
CALCIUM SERPL-MCNC: 9.3 MG/DL (ref 8.7–10.2)
CHLORIDE SERPL-SCNC: 103 MMOL/L (ref 96–106)
CO2 SERPL-SCNC: 24 MMOL/L (ref 20–29)
CREAT SERPL-MCNC: 0.7 MG/DL (ref 0.57–1)
EGFRCR SERPLBLD CKD-EPI 2021: 100 ML/MIN/1.73
EOSINOPHIL # BLD AUTO: 0.1 X10E3/UL (ref 0–0.4)
EOSINOPHIL NFR BLD AUTO: 3 %
ERYTHROCYTE [DISTWIDTH] IN BLOOD BY AUTOMATED COUNT: 12.7 % (ref 11.7–15.4)
GLOBULIN SER CALC-MCNC: 2.9 G/DL (ref 1.5–4.5)
GLUCOSE SERPL-MCNC: 95 MG/DL (ref 70–99)
HCT VFR BLD AUTO: 37.9 % (ref 34–46.6)
HGB BLD-MCNC: 12.4 G/DL (ref 11.1–15.9)
IMM GRANULOCYTES # BLD AUTO: 0 X10E3/UL (ref 0–0.1)
IMM GRANULOCYTES NFR BLD AUTO: 0 %
LYMPHOCYTES # BLD AUTO: 2 X10E3/UL (ref 0.7–3.1)
LYMPHOCYTES NFR BLD AUTO: 40 %
MCH RBC QN AUTO: 30.5 PG (ref 26.6–33)
MCHC RBC AUTO-ENTMCNC: 32.7 G/DL (ref 31.5–35.7)
MCV RBC AUTO: 93 FL (ref 79–97)
MONOCYTES # BLD AUTO: 0.4 X10E3/UL (ref 0.1–0.9)
MONOCYTES NFR BLD AUTO: 8 %
NEUTROPHILS # BLD AUTO: 2.3 X10E3/UL (ref 1.4–7)
NEUTROPHILS NFR BLD AUTO: 48 %
PLATELET # BLD AUTO: 193 X10E3/UL (ref 150–450)
POTASSIUM SERPL-SCNC: 4.3 MMOL/L (ref 3.5–5.2)
PROT SERPL-MCNC: 7.3 G/DL (ref 6–8.5)
RBC # BLD AUTO: 4.06 X10E6/UL (ref 3.77–5.28)
SODIUM SERPL-SCNC: 140 MMOL/L (ref 134–144)
WBC # BLD AUTO: 4.9 X10E3/UL (ref 3.4–10.8)

## 2025-06-17 PROCEDURE — 25510000001 IOPAMIDOL 61 % SOLUTION: Performed by: OBSTETRICS & GYNECOLOGY

## 2025-06-17 PROCEDURE — 74177 CT ABD & PELVIS W/CONTRAST: CPT

## 2025-06-17 RX ORDER — IOPAMIDOL 612 MG/ML
100 INJECTION, SOLUTION INTRAVASCULAR
Status: COMPLETED | OUTPATIENT
Start: 2025-06-17 | End: 2025-06-17

## 2025-06-17 RX ADMIN — IOPAMIDOL 85 ML: 612 INJECTION, SOLUTION INTRAVENOUS at 16:03

## 2025-07-09 ENCOUNTER — OFFICE VISIT (OUTPATIENT)
Dept: OBSTETRICS AND GYNECOLOGY | Facility: CLINIC | Age: 60
End: 2025-07-09
Payer: MEDICAID

## 2025-07-09 VITALS
DIASTOLIC BLOOD PRESSURE: 84 MMHG | SYSTOLIC BLOOD PRESSURE: 136 MMHG | WEIGHT: 182.2 LBS | HEIGHT: 65 IN | BODY MASS INDEX: 30.35 KG/M2

## 2025-07-09 DIAGNOSIS — Z11.51 SCREENING FOR HUMAN PAPILLOMAVIRUS: ICD-10-CM

## 2025-07-09 DIAGNOSIS — Z01.419 ROUTINE GYNECOLOGICAL EXAMINATION: Primary | ICD-10-CM

## 2025-07-09 DIAGNOSIS — R10.32 LLQ PAIN: ICD-10-CM

## 2025-07-09 DIAGNOSIS — Z01.419 CERVICAL SMEAR, AS PART OF ROUTINE GYNECOLOGICAL EXAMINATION: ICD-10-CM

## 2025-07-09 DIAGNOSIS — Z12.31 ENCOUNTER FOR SCREENING MAMMOGRAM FOR MALIGNANT NEOPLASM OF BREAST: ICD-10-CM

## 2025-07-09 NOTE — PROGRESS NOTES
GYN Annual Exam     CC- Here for annual exam.     TALIA Olguin is a 59 y.o. female est pt here for annual exam and f/u of LLQ pain. She denies any  VB. She was seen last month for LLQ pain and had a normal US and normal CT of the abdomen and pelvis. Her pain has resolved. She needs MMG. She is not SA at present and reports low libido. She does not have a partner.     OB History          2    Para   2    Term   2            AB        Living   2         SAB        IAB        Ectopic        Molar        Multiple        Live Births   2          Obstetric Comments   2                Menarche:13  Menopause:53  HRT: yes, on Prometrium only   Current contraception: post menopausal status and vasectomy  History of abnormal Pap smear: no   History of abnormal mammogram: no  Family history of uterine, colon or ovarian cancer: no  Family history of breast cancer: no  STD's: none  Last pap smear:2023 normal pap/HPV  Gardasil: missed  TONY: none      Health Maintenance   Topic Date Due    DIABETIC FOOT EXAM  Never done    DIABETIC EYE EXAM  Never done    URINE MICROALBUMIN-CREATININE RATIO (uACR)  Never done    Pneumococcal Vaccine 50+ (1 of 2 - PCV) Never done    ZOSTER VACCINE (1 of 2) Never done    HEPATITIS C SCREENING  Never done    ANNUAL PHYSICAL  Never done    COVID-19 Vaccine (2024- season) 2024    Annual Gynecologic Pelvic and Breast Exam  2025    HEMOGLOBIN A1C  08/10/2025    INFLUENZA VACCINE  10/01/2025    MAMMOGRAM  2026    PAP SMEAR  2028    COLORECTAL CANCER SCREENING  2029    TDAP/TD VACCINES (2 - Td or Tdap) 2031    Hepatitis B  Completed       Past Medical History:   Diagnosis Date    Disease of thyroid gland     GERD (gastroesophageal reflux disease)     uses otc as needed    Urination frequency        Past Surgical History:   Procedure Laterality Date    D & C HYSTEROSCOPY N/A 2022    Procedure: DILATATION AND CURETTAGE HYSTEROSCOPY;   Surgeon: Lavern Miner DO;  Location: [facility];  Service: Obstetrics/Gynecology;  Laterality: N/A;    THROAT SURGERY Bilateral 2020    2 cysts removed         Current Outpatient Medications:     ascorbic acid (VITAMIN C) 250 MG tablet, Take 1 tablet by mouth Daily., Disp: , Rfl:     Bacillus Coagulans-Inulin (Probiotic) 1-250 BILLION-MG capsule, Take  by mouth., Disp: , Rfl:     Cholecalciferol (Vitamin D3) 50 MCG (2000 UT) capsule, Take 1 capsule by mouth Daily., Disp: , Rfl:     estradiol (VAGIFEM) 10 MCG tablet vaginal tablet, Insert 1 tablet into the vagina 2 (Two) Times a Week., Disp: 8 tablet, Rfl: 3    famotidine (PEPCID) 40 MG tablet, Take 1 tablet by mouth 2 (Two) Times a Day., Disp: , Rfl:     hydrOXYzine (ATARAX) 25 MG tablet, Take 2 tablets by mouth Daily., Disp: , Rfl:     levothyroxine (SYNTHROID, LEVOTHROID) 75 MCG tablet, Take 1 tablet by mouth Daily., Disp: , Rfl:     Magnesium 100 MG capsule, Take  by mouth., Disp: , Rfl:     melatonin 5 MG tablet tablet, Take 2 tablets by mouth Every Night. 20mg, Disp: , Rfl:     Progesterone (PROMETRIUM) 100 MG capsule, TAKE 1 CAPSULE BY MOUTH DAILY, Disp: 90 capsule, Rfl: 1    QUEtiapine (SEROquel) 12.5 MG half tablet, , Disp: , Rfl:     triamcinolone (KENALOG) 0.025 % ointment, , Disp: , Rfl:     Zinc 100 MG tablet, Take  by mouth., Disp: , Rfl:     Allergies   Allergen Reactions    Egg-Derived Products Shortness Of Breath and Swelling    Latex Other (See Comments)     blisters    Nuts Rash       Social History     Tobacco Use    Smoking status: Never    Smokeless tobacco: Never   Vaping Use    Vaping status: Never Used   Substance Use Topics    Alcohol use: Yes     Comment: Social    Drug use: No       Family History   Problem Relation Age of Onset    Prostate cancer Father     Diabetes Father     Diabetes Mother     Breast cancer Neg Hx     Ovarian cancer Neg Hx     Uterine cancer Neg Hx     Colon cancer Neg Hx     Deep vein thrombosis Neg Hx      "Pulmonary embolism Neg Hx        Review of Systems   Constitutional:  Negative for activity change, appetite change, fatigue, fever and unexpected weight change.   Eyes:  Negative for photophobia and visual disturbance.   Respiratory:  Negative for cough and shortness of breath.    Cardiovascular:  Negative for chest pain and palpitations.   Gastrointestinal:  Negative for abdominal distention, abdominal pain, constipation, diarrhea and nausea.   Endocrine: Negative for cold intolerance and heat intolerance.   Genitourinary:  Positive for vaginal pain (dryness). Negative for dyspareunia, dysuria, frequency, menstrual problem, pelvic pain, vaginal bleeding and vaginal discharge.   Musculoskeletal:  Negative for back pain.   Skin:  Negative for color change and rash.   Neurological:  Negative for headaches.   Hematological:  Negative for adenopathy. Does not bruise/bleed easily.   Psychiatric/Behavioral:  Negative for dysphoric mood. The patient is not nervous/anxious.        /84   Ht 165.1 cm (65\")   Wt 82.6 kg (182 lb 3.2 oz)   LMP 06/01/2017 (Exact Date)   BMI 30.32 kg/m²     Physical Exam  Vitals and nursing note reviewed. Exam conducted with a chaperone present.   Constitutional:       Appearance: Normal appearance. She is well-developed. She is obese.   HENT:      Head: Normocephalic and atraumatic.   Eyes:      General: No scleral icterus.     Conjunctiva/sclera: Conjunctivae normal.   Neck:      Thyroid: No thyromegaly.   Cardiovascular:      Rate and Rhythm: Normal rate and regular rhythm.   Pulmonary:      Effort: Pulmonary effort is normal.      Breath sounds: Normal breath sounds.   Chest:   Breasts:     Right: No swelling, bleeding, inverted nipple, mass, nipple discharge, skin change or tenderness.      Left: No swelling, bleeding, inverted nipple, mass, nipple discharge, skin change or tenderness.   Abdominal:      General: Bowel sounds are normal. There is no distension.      Palpations: " Abdomen is soft. There is no mass.      Tenderness: There is no abdominal tenderness. There is no guarding or rebound.      Hernia: No hernia is present.   Genitourinary:     Exam position: Supine.      Labia:         Right: No rash, tenderness or lesion.         Left: No rash, tenderness or lesion.       Urethra: No prolapse, urethral pain, urethral swelling or urethral lesion.      Vagina: No signs of injury and foreign body. No vaginal discharge, erythema, tenderness or bleeding.      Cervix: No cervical motion tenderness, discharge or friability.      Uterus: Not deviated, not enlarged, not fixed and not tender.       Adnexa:         Right: No mass, tenderness or fullness.          Left: No mass, tenderness or fullness.        Rectum: Normal.      Comments: Mild atrophy  Musculoskeletal:      Cervical back: Neck supple.   Skin:     General: Skin is warm and dry.   Neurological:      Mental Status: She is alert and oriented to person, place, and time.   Psychiatric:         Behavior: Behavior normal.         Thought Content: Thought content normal.         Judgment: Judgment normal.            Assessment/Plan    1) GYN HM: UTD 2/2023- nl pap/HPV . Check pap/HPV SBE demonstrated and encouraged.  2) STD screening: declines Condoms encouraged.  3) Bone health - Weight bearing exercise, dietary calcium recommendations and vitamin D reviewed.   4) Diet and Exercise discussed  5) Smoking Status: No  6) LLQ pain- normal pelvic US and normal CT A/P. Symptoms resolved.  7)MMG- UTD 6/2024 B1. Schedule MMG now  8) DEXA- UTD 3/2024 osteopenia 11 & 0.7 %. Repeat DEXA in 2-3 years  9)C scope-UTD 9/2019- repeat in 10 years  10) HRT- Patient counseled that hormone treatment should be used to help with specific symptoms related to menopause such as hot flashes, night sweats and vaginal atrophy.  Hormones should not be given for “general wellbeing” or to avoid aging. The lowest dose hormone that treats symptoms should be used for  the shortest amount of time possible.  Although estrogen is the most effective treatment for hot flashes, other non hormonal options exist (such as Brisdelle or venlafaxine) and should also be considered.  Anyone with an intact uterus should also receive progestogen to prevent uterine overgrowth that can lead to uterine cancer.  All hormone therapy, whether it is synthetic or bio identical, can lead to increased risk of stroke, heart attack and thromboembolic diseases.  These adverse events are more likely to develop in the first year of use and in patients who are older than the typical menopausal age.  Risks of estrogen dependent cancers such as breast cancer increase with prolonged use.  Attempts to wean hormone therapy should be discussed annually and particularly after three to five years of use.  Any side effects such as vaginal bleeding or pain should be reported immediately.  She wants to continue Prometrium 100 mg QHS  11) Vaginal atrophy- ERx Vagifem  x2/week. Patient counseled that vaginal estrogen rings, creams and tablets are available and highly effective at treating local vaginal symptoms such as atrophy and vaginal dryness.  Vaginal estrogen does not cause uterine overgrowth and does not require a progestogen to protect the uterus.  Very small amounts of estrogen are absorbed systemically.  For patients with a history of an estrogen dependent cancer such as breast cancer, the decision to use local estrogen for local vaginal symptoms should be made after consultation with her oncologist.  Possible side effects include local irritation or burning and/or vaginal bleeding and should always be reported.    12) Parts of this document have been copied or forwarded from her previous visits and have been reviewed, updated and edited as indicated.   13) Follow up prn and 1 year annual          Diagnoses and all orders for this visit:    1. Routine gynecological examination (Primary)  -     POC Urinalysis  Dipstick, Multipro  -     IGP, Apt HPV,rfx 16 / 18,45    2. Cervical smear, as part of routine gynecological examination  -     IGP, Apt HPV,rfx 16 / 18,45    3. Screening for human papillomavirus  -     IGP, Apt HPV,rfx 16 / 18,45    4. LLQ pain    5. Encounter for screening mammogram for malignant neoplasm of breast  -     Mammo Screening Digital Tomosynthesis Bilateral With CAD; Future        Nunu Valencia Smith MD  07/9/2025    17:51 EDT

## 2025-07-11 LAB
CYTOLOGIST CVX/VAG CYTO: NORMAL
CYTOLOGY CVX/VAG DOC CYTO: NORMAL
CYTOLOGY CVX/VAG DOC THIN PREP: NORMAL
DX ICD CODE: NORMAL
HPV I/H RISK 4 DNA CVX QL PROBE+SIG AMP: NEGATIVE
OTHER STN SPEC: NORMAL
SERVICE CMNT-IMP: NORMAL
STAT OF ADQ CVX/VAG CYTO-IMP: NORMAL

## 2025-08-15 RX ORDER — ESTRADIOL 0.1 MG/G
CREAM VAGINAL
Qty: 42.5 G | Refills: 0 | Status: SHIPPED | OUTPATIENT
Start: 2025-08-15

## (undated) DEVICE — 1000ML,PRESSURE INFUSER W/STOPCOCK: Brand: MEDLINE

## (undated) DEVICE — LAG PERI GYN: Brand: MEDLINE INDUSTRIES, INC.

## (undated) DEVICE — TBG PENCL TELESCP MEGADYNE SMOKE EVAC 10FT

## (undated) DEVICE — GLV SURG SENSICARE PI MIC PF SZ6 LF STRL

## (undated) DEVICE — CONMED ELECTROSURGICAL ACCESSORY, BALL ELECTRODE, 5 MM, EXTENDED SHAFT: Brand: CONMED

## (undated) DEVICE — PAD SANI MAXI W/ADHS SNG WRP 11IN

## (undated) DEVICE — CYSTO/BLADDER IRRIGATION SET, REGULATING CLAMP